# Patient Record
Sex: FEMALE | Race: WHITE | HISPANIC OR LATINO | URBAN - METROPOLITAN AREA
[De-identification: names, ages, dates, MRNs, and addresses within clinical notes are randomized per-mention and may not be internally consistent; named-entity substitution may affect disease eponyms.]

---

## 2023-05-30 ENCOUNTER — EMERGENCY (EMERGENCY)
Facility: HOSPITAL | Age: 78
LOS: 1 days | Discharge: HOME | End: 2023-05-30
Attending: EMERGENCY MEDICINE
Payer: SELF-PAY

## 2023-05-30 VITALS
DIASTOLIC BLOOD PRESSURE: 76 MMHG | HEART RATE: 66 BPM | TEMPERATURE: 99 F | OXYGEN SATURATION: 95 % | WEIGHT: 149.91 LBS | SYSTOLIC BLOOD PRESSURE: 128 MMHG | RESPIRATION RATE: 20 BRPM | HEIGHT: 60 IN

## 2023-05-30 LAB
ALBUMIN SERPL ELPH-MCNC: 4.3 G/DL — SIGNIFICANT CHANGE UP (ref 3.3–5)
ALP SERPL-CCNC: 89 U/L — SIGNIFICANT CHANGE UP (ref 40–120)
ALT FLD-CCNC: 29 U/L — SIGNIFICANT CHANGE UP (ref 10–45)
ANION GAP SERPL CALC-SCNC: 17 MMOL/L — SIGNIFICANT CHANGE UP (ref 5–17)
APPEARANCE UR: CLEAR — SIGNIFICANT CHANGE UP
AST SERPL-CCNC: 36 U/L — SIGNIFICANT CHANGE UP (ref 10–40)
BACTERIA # UR AUTO: NEGATIVE — SIGNIFICANT CHANGE UP
BASOPHILS # BLD AUTO: 0.05 K/UL — SIGNIFICANT CHANGE UP (ref 0–0.2)
BASOPHILS NFR BLD AUTO: 0.6 % — SIGNIFICANT CHANGE UP (ref 0–2)
BILIRUB SERPL-MCNC: 0.3 MG/DL — SIGNIFICANT CHANGE UP (ref 0.2–1.2)
BILIRUB UR-MCNC: NEGATIVE — SIGNIFICANT CHANGE UP
BUN SERPL-MCNC: 18 MG/DL — SIGNIFICANT CHANGE UP (ref 7–23)
CALCIUM SERPL-MCNC: 9.4 MG/DL — SIGNIFICANT CHANGE UP (ref 8.4–10.5)
CHLORIDE SERPL-SCNC: 97 MMOL/L — SIGNIFICANT CHANGE UP (ref 96–108)
CO2 SERPL-SCNC: 22 MMOL/L — SIGNIFICANT CHANGE UP (ref 22–31)
COLOR SPEC: SIGNIFICANT CHANGE UP
CREAT SERPL-MCNC: 0.62 MG/DL — SIGNIFICANT CHANGE UP (ref 0.5–1.3)
DIFF PNL FLD: ABNORMAL
EGFR: 91 ML/MIN/1.73M2 — SIGNIFICANT CHANGE UP
EOSINOPHIL # BLD AUTO: 0.7 K/UL — HIGH (ref 0–0.5)
EOSINOPHIL NFR BLD AUTO: 8.8 % — HIGH (ref 0–6)
EPI CELLS # UR: 1 /HPF — SIGNIFICANT CHANGE UP
GLUCOSE SERPL-MCNC: 103 MG/DL — HIGH (ref 70–99)
GLUCOSE UR QL: NEGATIVE — SIGNIFICANT CHANGE UP
HCT VFR BLD CALC: 47 % — HIGH (ref 34.5–45)
HGB BLD-MCNC: 15.5 G/DL — SIGNIFICANT CHANGE UP (ref 11.5–15.5)
HYALINE CASTS # UR AUTO: 0 /LPF — SIGNIFICANT CHANGE UP (ref 0–2)
IMM GRANULOCYTES NFR BLD AUTO: 0.8 % — SIGNIFICANT CHANGE UP (ref 0–0.9)
KETONES UR-MCNC: NEGATIVE — SIGNIFICANT CHANGE UP
LEUKOCYTE ESTERASE UR-ACNC: ABNORMAL
LYMPHOCYTES # BLD AUTO: 2.57 K/UL — SIGNIFICANT CHANGE UP (ref 1–3.3)
LYMPHOCYTES # BLD AUTO: 32.4 % — SIGNIFICANT CHANGE UP (ref 13–44)
MAGNESIUM SERPL-MCNC: 2 MG/DL — SIGNIFICANT CHANGE UP (ref 1.6–2.6)
MCHC RBC-ENTMCNC: 30.2 PG — SIGNIFICANT CHANGE UP (ref 27–34)
MCHC RBC-ENTMCNC: 33 GM/DL — SIGNIFICANT CHANGE UP (ref 32–36)
MCV RBC AUTO: 91.6 FL — SIGNIFICANT CHANGE UP (ref 80–100)
MONOCYTES # BLD AUTO: 0.68 K/UL — SIGNIFICANT CHANGE UP (ref 0–0.9)
MONOCYTES NFR BLD AUTO: 8.6 % — SIGNIFICANT CHANGE UP (ref 2–14)
NEUTROPHILS # BLD AUTO: 3.86 K/UL — SIGNIFICANT CHANGE UP (ref 1.8–7.4)
NEUTROPHILS NFR BLD AUTO: 48.8 % — SIGNIFICANT CHANGE UP (ref 43–77)
NITRITE UR-MCNC: NEGATIVE — SIGNIFICANT CHANGE UP
NRBC # BLD: 0 /100 WBCS — SIGNIFICANT CHANGE UP (ref 0–0)
PH UR: 6 — SIGNIFICANT CHANGE UP (ref 5–8)
PLATELET # BLD AUTO: 304 K/UL — SIGNIFICANT CHANGE UP (ref 150–400)
POTASSIUM SERPL-MCNC: 3.9 MMOL/L — SIGNIFICANT CHANGE UP (ref 3.5–5.3)
POTASSIUM SERPL-SCNC: 3.9 MMOL/L — SIGNIFICANT CHANGE UP (ref 3.5–5.3)
PROT SERPL-MCNC: 7.8 G/DL — SIGNIFICANT CHANGE UP (ref 6–8.3)
PROT UR-MCNC: SIGNIFICANT CHANGE UP
RBC # BLD: 5.13 M/UL — SIGNIFICANT CHANGE UP (ref 3.8–5.2)
RBC # FLD: 13.4 % — SIGNIFICANT CHANGE UP (ref 10.3–14.5)
RBC CASTS # UR COMP ASSIST: 5 /HPF — HIGH (ref 0–4)
SODIUM SERPL-SCNC: 136 MMOL/L — SIGNIFICANT CHANGE UP (ref 135–145)
SP GR SPEC: 1.01 — LOW (ref 1.01–1.02)
UROBILINOGEN FLD QL: NEGATIVE — SIGNIFICANT CHANGE UP
WBC # BLD: 7.92 K/UL — SIGNIFICANT CHANGE UP (ref 3.8–10.5)
WBC # FLD AUTO: 7.92 K/UL — SIGNIFICANT CHANGE UP (ref 3.8–10.5)
WBC UR QL: 10 /HPF — HIGH (ref 0–5)

## 2023-05-30 PROCEDURE — 76856 US EXAM PELVIC COMPLETE: CPT | Mod: 26

## 2023-05-30 PROCEDURE — 99285 EMERGENCY DEPT VISIT HI MDM: CPT

## 2023-05-30 PROCEDURE — 99283 EMERGENCY DEPT VISIT LOW MDM: CPT

## 2023-05-30 PROCEDURE — 74177 CT ABD & PELVIS W/CONTRAST: CPT | Mod: 26,MA

## 2023-05-30 RX ORDER — ACETAMINOPHEN 500 MG
650 TABLET ORAL ONCE
Refills: 0 | Status: COMPLETED | OUTPATIENT
Start: 2023-05-30 | End: 2023-05-30

## 2023-05-30 RX ORDER — CEFTRIAXONE 500 MG/1
1000 INJECTION, POWDER, FOR SOLUTION INTRAMUSCULAR; INTRAVENOUS ONCE
Refills: 0 | Status: COMPLETED | OUTPATIENT
Start: 2023-05-30 | End: 2023-05-30

## 2023-05-30 RX ADMIN — Medication 650 MILLIGRAM(S): at 17:02

## 2023-05-30 RX ADMIN — CEFTRIAXONE 100 MILLIGRAM(S): 500 INJECTION, POWDER, FOR SOLUTION INTRAMUSCULAR; INTRAVENOUS at 20:33

## 2023-05-30 NOTE — ED PROVIDER NOTE - CLINICAL SUMMARY MEDICAL DECISION MAKING FREE TEXT BOX
78-year-old female with PMH HTN presenting with 1 month of dysuria and vaginal pain.  Patient told she had a hematoma near her vagina/urethra [place] additional work-up for this.  Has trialed antibiotics outpatient with minimal improvement in symptoms.  Overall well-appearing with vaginal mass noted on pelvic exam.  Will obtain basic labs, UA/UC, CT abdomen pelvis to further investigate.

## 2023-05-30 NOTE — ED PROVIDER NOTE - NSFOLLOWUPCLINICS_GEN_ALL_ED_FT
[facility]  Urology  [address]  [place] [address]  Phone: [phone]  Fax:     [facility]  OB/GYN & Surg  [address] [place] [address]  Phone: [phone]  Fax:     [facility]  OB-GYN  [address] [place] [address]  Phone: [phone]  Fax:

## 2023-05-30 NOTE — ED ADULT NURSE NOTE - OBJECTIVE STATEMENT
1615 pt 78yf aox4 bib daughter c/o burning and pain on urination, arrived fr [facility] 1wk ago, noted w/ vaginal wall hematoma c/o pain to site x 1month, pt w/ daughter at bedside able to verbalize concerns, pending eval

## 2023-05-30 NOTE — ED PROVIDER NOTE - NSFOLLOWUPINSTRUCTIONS_ED_ALL_ED_FT
Please follow-up with gynecology and urology, the numbers are attached below, please call this number to set up an appointment.  Please return to the emergency department for any worsening symptoms such as those listed below.  An antibiotic was sent to your pharmacy, please take this as prescribed for the urine infection.    Urinary Tract Infection    A urinary tract infection (UTI) is an infection of any part of the urinary tract, which includes the kidneys, ureters, bladder, and urethra. Risk factors include ignoring your need to urinate, wiping back to front if female, being an uncircumcised male, and having diabetes or a weak immune system. Symptoms include frequent urination, pain or burning with urination, foul smelling urine, cloudy urine, pain in the lower abdomen, blood in the urine, and fever. If you were prescribed an antibiotic medicine, take it as told by your health care provider. Do not stop taking the antibiotic even if you start to feel better.    SEEK IMMEDIATE MEDICAL CARE IF YOU HAVE ANY OF THE FOLLOWING SYMPTOMS: unable to urinate, severe back or abdominal pain, fever, inability to keep fluids or medicine down, dizziness/lightheadedness, or a change in mental status.

## 2023-05-30 NOTE — CONSULT NOTE ADULT - SUBJECTIVE AND OBJECTIVE BOX
78Y F PMH HTN presenting with dysuria x1 month. Per her [place] patient [place] [facility] about 1 week ago. Currently on day 4/5 of ciprofloxacin for presumed UTI. Daughter reports that patient has had significant urinary burning and discomfort x1 month; was told by a physician in [facility] that she has a hematoma near her vagina. SHe is able to urinate but has pain.  SHe does have to strain to urinate and is having frequency as well. Has had two episodes of gross hematuria recently.       PAST MEDICAL & SURGICAL HISTORY:  Hypertension      No significant past surgical history        MEDICATIONS  (STANDING):    MEDICATIONS  (PRN):    FAMILY HISTORY:    Allergies    No Known Allergies    Intolerances      SOCIAL HISTORY:   Tobacco hx:    REVIEW OF SYSTEMS: Pertinent positives and negatives as stated in HPI, otherwise negative    Vital signs  T(C): 37 (23 @ 13:42), Max: 37 (23 @ 13:42)  HR: 66 (23 @ 13:42)  BP: 128/76 (23 @ 13:42)  SpO2: 95% (23 @ 13:42)  Wt(kg): --    Output    UOP    Physical Exam  Gen: NAD  Pulm: No respiratory distress, no subcostal retractions  CV: RRR, no JVD  Abd: Soft, NT, ND  : + purple, hard mass extending from vaginal introitus, very tender to palpation, unable to identify urethra   MSK: No edema present    LABS:           @ 16:33    WBC 7.92  / Hct 47.0  / SCr 0.62         136  |  97  |  18  ----------------------------<  103<H>  3.9   |  22  |  0.62    Ca    9.4      30 May 2023 16:33  Mg     2.0         TPro  7.8  /  Alb  4.3  /  TBili  0.3  /  DBili  x   /  AST  36  /  ALT  29  /  AlkPhos  89        Urinalysis Basic - ( 30 May 2023 16:33 )    Color: Light Yellow / Appearance: Clear / S.009 / pH: x  Gluc: x / Ketone: Negative  / Bili: Negative / Urobili: Negative   Blood: x / Protein: Trace / Nitrite: Negative   Leuk Esterase: Large / RBC: 5 /hpf / WBC 10 /HPF   Sq Epi: x / Non Sq Epi: x / Bacteria: Negative        Urine Cx:   Blood Cx:    RADIOLOGY:    pending      78Y F PMH HTN presenting with dysuria x1 month. Per her daughter patient [place] [facility] about 1 week ago. Currently on day 4/5 of ciprofloxacin for presumed UTI. Daughter reports that patient has had significant urinary burning and discomfort x1 month; was told by a physician in [facility] that she has a hematoma near her vagina. SHe is able to urinate but has pain.  SHe does have to strain to urinate and is having frequency as well. Has had two episodes of gross hematuria recently.       PAST MEDICAL & SURGICAL HISTORY:  Hypertension      No significant past surgical history        MEDICATIONS  (STANDING):    MEDICATIONS  (PRN):    FAMILY HISTORY:    Allergies    No Known Allergies    Intolerances      SOCIAL HISTORY:   Tobacco hx:    REVIEW OF SYSTEMS: Pertinent positives and negatives as stated in HPI, otherwise negative    Vital signs  T(C): 37 (23 @ 13:42), Max: 37 (23 @ 13:42)  HR: 66 (23 @ 13:42)  BP: 128/76 (23 @ 13:42)  SpO2: 95% (23 @ 13:42)  Wt(kg): --    Output    UOP    Physical Exam  Gen: NAD  Pulm: No respiratory distress, no subcostal retractions  CV: RRR, no JVD  Abd: Soft, NT, ND  : + purple, multiple hard masses in the vulva and near the urethra, very tender to palpation, unable to identify urethra   MSK: No edema present    LABS:           @ 16:33    WBC 7.92  / Hct 47.0  / SCr 0.62         136  |  97  |  18  ----------------------------<  103<H>  3.9   |  22  |  0.62    Ca    9.4      30 May 2023 16:33  Mg     2.0         TPro  7.8  /  Alb  4.3  /  TBili  0.3  /  DBili  x   /  AST  36  /  ALT  29  /  AlkPhos  89        Urinalysis Basic - ( 30 May 2023 16:33 )    Color: Light Yellow / Appearance: Clear / S.009 / pH: x  Gluc: x / Ketone: Negative  / Bili: Negative / Urobili: Negative   Blood: x / Protein: Trace / Nitrite: Negative   Leuk Esterase: Large / RBC: 5 /hpf / WBC 10 /HPF   Sq Epi: x / Non Sq Epi: x / Bacteria: Negative        Urine Cx:   Blood Cx:    RADIOLOGY:    pending

## 2023-05-30 NOTE — ED PROVIDER NOTE - PROGRESS NOTE DETAILS
Katelyn Junior DO (PGY2): Patient signed out to me by day team.  Patient with vaginal mass concerning for possible cervical versus vaginal versus urethral cancer.  CT showing lymph nodes concern for neoplastic disease.  Patient with mild UTI.  Patient seen by urology and gynecology.  Patient to follow-up with urology and gynecology outpatient for further malignancy work-up.  Will discharge with p.o. antibiotics, patient status post IV ceftriaxone here. Pt and family made aware of lab and imaging results, including incidental findings. Questions regarding their symptoms were addressed. Advised to follow up with gyn/urology. Given strict return precautions. Pt verbalized understanding.

## 2023-05-30 NOTE — ED PROVIDER NOTE - PATIENT PORTAL LINK FT
You can access the FollowMyHealth Patient Portal offered by [facility] by registering at the following website: [email or web address]. By joining [facility]’s FollowMyHealth portal, you will also be able to view your health information using other applications (apps) compatible with our system.

## 2023-05-30 NOTE — ED PROVIDER NOTE - ATTENDING CONTRIBUTION TO CARE
Attending MD Salguero:  I personally have seen and examined this patient. I have performed a substantive portion of the visit including all aspects of the medical decision making.  Resident note reviewed and agree on plan of care and except where noted.        78-year-old woman with a history of hypertension's presenting for evaluation of dysuria and vaginal discomfort for about a month.  She has also some difficulty urinating and also urinary incontinence at times otherwise.  She is on ciprofloxacin day 4 of 5 for presumed UTI.  No flank or back pain no fevers or chills.  Patient was told recently that she had a vaginal mass and that she was supposed to come in for further work-up and this but has not done so yet.    Vital signs nonactionable.  The patient's sitting in the stretcher no apparent distress.  High BMI.  Abdomen is soft nondistended nontender.   examination with partially visualized vaginal mass in about the introitus, it is purple in color.  Extremities warm and well-perfused affect within normal limits.    Patient presenting for urinary symptoms and exam notable for vaginal mass.  We will obtain CT abdomen pelvis to further initiate work-up of vaginal mass.  We will see on the CT if there is any urinary retention that may require Carr placement due to presence of vaginal mass.  Depending on CT findings next diagnostic steps will be determined thereafter.      *The above represents an initial assessment/impression. Please refer to progress notes for potential changes in patient clinical course*

## 2023-05-30 NOTE — CONSULT NOTE ADULT - ASSESSMENT
- No acute GYN intervention   - Concern for possible cervical etiology vs bladder etiology   - Patient will need GYN outpatient follow up for biopsy. She is currently being worked up by her GYN in [facility], but her daughter would like to start workup here. Phone number to OBGYN clinic given to patient's daughter. Additionally, GYN team will reach out to the clinic schedulers to facilitate scheduling.      Isabelle Ellis, PGY2  d/w Dr. Matos  79 yo  LMP 30+ years ago presenting with pain with urination x1 week. Vaginal mass present on exam. Patient unable to tolerate speculum exam. Purple mass visible at the introitus. Digital exam revealed smooth mass possibly contiguous with the cervix. Given the imaging, additional concern for bladder involvement of the mass.     - No acute GYN intervention   - Concern for possible cervical etiology vs bladder etiology   - Patient will need GYN outpatient follow up for biopsy. She is currently being worked up by her GYN in [facility], but her daughter would like to start workup here. Phone number to OBGYN clinic given to patient's daughter. Additionally, GYN team will reach out to the clinic schedulers to facilitate scheduling.      Isabelle Ellis, PGY2  d/w Dr. Matos

## 2023-05-30 NOTE — CONSULT NOTE ADULT - SUBJECTIVE AND OBJECTIVE BOX
KARTHIKEYAN ALCOCER  78y  Female [id number]    HPI:        Name of GYN Physician:     Past OB:      Past gyn: Denies fibroids, cysts, endometriosis, STI's, Abnormal pap smears     Home meds:     Hospital Meds:   MEDICATIONS  (STANDING):    MEDICATIONS  (PRN):      Allergies    No Known Allergies    Intolerances        PAST MEDICAL & SURGICAL HISTORY:  Hypertension      No significant past surgical history          FAMILY HISTORY:      Social History:  Denies smoking use, drug use, alcohol use.   +occasional social alcohol use    Vital Signs Last 24 Hrs  T(C): 37 (30 May 2023 13:42), Max: 37 (30 May 2023 13:42)  T(F): 98.6 (30 May 2023 13:42), Max: 98.6 (30 May 2023 13:42)  HR: 65 (30 May 2023 19:40) (65 - 66)  BP: 145/74 (30 May 2023 19:40) (128/76 - 145/74)  BP(mean): --  RR: 18 (30 May 2023 19:40) (18 - 20)  SpO2: 94% (30 May 2023 19:40) (94% - 95%)    Parameters below as of 30 May 2023 19:40  Patient On (Oxygen Delivery Method): room air        Physical Exam:   General: sitting comfortably in bed, NAD   CV: RR S1S2 no m/r/g  Lungs: CTA b/l, good air flow b/l   Back: No CVA tenderness  Abd: Soft, non-tender, non-distended.  Bowel sounds present.    :  No bleeding on pad.    External labia wnl.  Bimanual exam with no cervical motion tenderness, uterus wnl, adnexa non palpable b/l.  Cervix closed vs. Cervix dilated    cm   Speculum Exam: No active bleeding from os.  Physiologic discharge.    Ext: non-tender b/l, no edema     LABS:                              15.5   7.92  )-----------( 304      ( 30 May 2023 16:33 )             47.0     05-    136  |  97  |  18  ----------------------------<  103<H>  3.9   |  22  |  0.62    Ca    9.4      30 May 2023 16:33  Mg     2.0         TPro  7.8  /  Alb  4.3  /  TBili  0.3  /  DBili  x   /  AST  36  /  ALT  29  /  AlkPhos  89  05-30    I&O's Detail      Urinalysis Basic - ( 30 May 2023 16:33 )    Color: Light Yellow / Appearance: Clear / S.009 / pH: x  Gluc: x / Ketone: Negative  / Bili: Negative / Urobili: Negative   Blood: x / Protein: Trace / Nitrite: Negative   Leuk Esterase: Large / RBC: 5 /hpf / WBC 10 /HPF   Sq Epi: x / Non Sq Epi: x / Bacteria: Negative        RADIOLOGY & ADDITIONAL STUDIES: KARTHIKEYAN ALCOCER  78y  Female [id number]    HPI:  79 yo  LMP 30+ years ago presenting with pain with urination x1 week. Pt lives in [facility] and is visiting family. Daughter states that her mother was being evaluated for urinary symptoms in [facility] and was given Cipro to take for a presumed UTI. Her gynecologist there saw a mass in the vagina and recommended further imaging and workup that they would continue once she returned from her trip to the [place]. Patient endorsing dysuria, frequency, and urgency. Patient denies vaginal bleeding, abdominal pain.     Name of GYN Physician: A doctor in [facility]     Past OB:     x7     Past gyn: Denies fibroids, cysts, endometriosis, STI's, Abnormal pap smears. Has not had a pap smear in many years.     Home meds: Amlodipine 10 mg qd     Allergies: No Known Allergies    PAST MEDICAL & SURGICAL HISTORY:  - Hypertension    Social History:  Denies smoking use, drug use, alcohol use.       Vital Signs Last 24 Hrs  T(C): 37 (30 May 2023 13:42), Max: 37 (30 May 2023 13:42)  T(F): 98.6 (30 May 2023 13:42), Max: 98.6 (30 May 2023 13:42)  HR: 65 (30 May 2023 19:40) (65 - 66)  BP: 145/74 (30 May 2023 19:40) (128/76 - 145/74)  BP(mean): --  RR: 18 (30 May 2023 19:40) (18 - 20)  SpO2: 94% (30 May 2023 19:40) (94% - 95%)    Parameters below as of 30 May 2023 19:40  Patient On (Oxygen Delivery Method): room air        Physical Exam:   General: sitting comfortably in bed, NAD   Abd: Soft, non-tender, non-distended.    :  No bleeding on pad. Palpable purple appearing mass visualized at the introitus of the vagina.  Patient able to tolerate a single digital exam. Mass feels smooth and contiguous with cervix. No anterior or posterior masses palpated.   Speculum Exam: Unable to tolerate speculum exam    Ext: non-tender b/l, no edema     LABS:                              15.5   7.92  )-----------( 304      ( 30 May 2023 16:33 )             47.0     05-30    136  |  97  |  18  ----------------------------<  103<H>  3.9   |  22  |  0.62    Ca    9.4      30 May 2023 16:33  Mg     2.0         TPro  7.8  /  Alb  4.3  /  TBili  0.3  /  DBili  x   /  AST  36  /  ALT  29  /  AlkPhos  89      I&O's Detail      Urinalysis Basic - ( 30 May 2023 16:33 )    Color: Light Yellow / Appearance: Clear / S.009 / pH: x  Gluc: x / Ketone: Negative  / Bili: Negative / Urobili: Negative   Blood: x / Protein: Trace / Nitrite: Negative   Leuk Esterase: Large / RBC: 5 /hpf / WBC 10 /HPF   Sq Epi: x / Non Sq Epi: x / Bacteria: Negative        RADIOLOGY & ADDITIONAL STUDIES:    < from: CT Abdomen and Pelvis w/ IV Cont (23 @ 18:50) >  CT ABDOMEN AND PELVIS IC   ORDERED BY:  LOCO MI     PROCEDURE DATE:  2023          INTERPRETATION:  CLINICAL INFORMATION: Hematuria, evaluate for stone   versus cystitis.    COMPARISON: None.    CONTRAST/COMPLICATIONS:  IV Contrast: Omnipaque 350  90 cc administered   10 cc discarded  Oral Contrast: NONE  Complications: None reported at time of study completion    PROCEDURE:  CT of the Abdomen and Pelvis was performed.  Sagittal and coronal reformats were performed.    FINDINGS:  LOWER CHEST: Within normal limits.    LIVER: Fatty infiltration. 1.0 cm hyperattenuating focus lateral segment.  BILE DUCTS: Normal caliber.  GALLBLADDER: Within normal limits.  SPLEEN: Within normal limits.  PANCREAS: Within normal limits.  ADRENALS: A 2.6 cm left adrenal nodule.  KIDNEYS/URETERS: Within normal limits.    BLADDER:  Minimal perivesicle edema.  REPRODUCTIVE ORGANS: Uterus and adnexa within normal limits.    BOWEL: No bowel obstruction. Appendix is normal.  PERITONEUM: No ascites.  VESSELS: Atherosclerotic changes.  RETROPERITONEUM/LYMPH NODES: No lymphadenopathy. Lobulated 2.0 cm   enlarged left inguinal lymph node. Enlarged 1.5 cm right inguinal lymph   node.  ABDOMINAL WALL: Moderate fat-containing right inguinal hernia.  BONES: Degenerative changes.    IMPRESSION:  Minimal perivesical edema is nonspecific but may be seen in the setting   of cystitis. Correlation with urinalysis recommended.    Enlarged abnormal appearing bilateral inguinal inguinal lymphnodes for   which neoplastic disease is in the differential diagnosis. Further   evaluation recommended as an outpatient with consideration of   ultrasound-guided biopsy.    Indeterminate 2.6 cm left adrenal nodule. Recommend MR abdomen as an   outpatient.        --- End of Report ---      < end of copied text >

## 2023-05-30 NOTE — ED PROVIDER NOTE - OBJECTIVE STATEMENT
78Y F PMH HTN presenting with urinary pain x1 month. Patient's daughter is at bedside providing history and interpretation - [place] [facility] about 1 week ago. Currently on day 4/5 of ciprofloxacin for presumed UTI. Daughter reports that patient has had significant urinary burning and discomfort x1 month; was told by a physician in [facility] that she has a hematoma near her vagina. Within the last week has been 78Y F PMH HTN presenting with urinary pain x1 month. Patient's daughter is at bedside providing history and interpretation - states that [place] from [facility] about 1 week ago. Currently on day 4/5 of ciprofloxacin for presumed UTI. Daughter reports that patient has had significant urinary burning and discomfort x1 month; was told by a physician in [facility] that she has a hematoma near her vagina. Within the last week has been having worsening dysuria and now seeing blood in urine. Patient endorsing vaginal discomfort, denies abdominal pain, fevers, n/v/d, leg pain or swelling.

## 2023-05-30 NOTE — ED PROVIDER NOTE - PHYSICAL EXAMINATION
GENERAL: Awake, alert, NAD  HEAD: NC/AT, neck supple, moist mucous membranes  EYES: PERRL, EOM grossly intact, sclera anicteric  LUNGS: normal WOB on RA, CTAB, no wheezes or crackles   CARDIAC: RRR, no m/r/g  ABDOMEN: obese abdomen, soft, nontender to palpation  PELVIC EXAM: performed by me chaperoned by JHON Wren - small red/purple mass noted within vaginal opening, very tender to palpation   BACK: No midline spinal tenderness, no CVA tenderness  EXT: No edema, no calf tenderness, no deformities.  NEURO: A&Ox3. Moving all extremities.  SKIN: Warm and dry. No rash.  PSYCH: Normal affect.

## 2023-05-30 NOTE — CONSULT NOTE ADULT - ASSESSMENT
78 year old female with vaginal mass, as well as dysuria and urinary frequency  - f/u CT scan, mass highly concerning for malignancy  - pain control  - check bladder scan   - consider gyn consult   - Urology can place culver catheter if need be   78 year old female with vaginal mass, as well as dysuria, pelvic discomfort, and urinary frequency  - f/u CT scan, mass highly concerning for malignancy  - pain control  - check bladder scan   - consider gyn consult since mass extending from vaginal introitus   - Urology can place culver catheter if need be   78 year old female with vaginal mass, as well as dysuria, pelvic discomfort, and urinary frequency  - f/u CT scan, mass highly concerning for malignancy  - pain control  - check bladder scan   - consider gyn consult since mass extending from vaginal introitus      78 year old female with vaginal mass, as well as dysuria, pelvic discomfort, and urinary frequency  - f/u CT scan, mass highly concerning for malignancy  - pain control  - check bladder scan   - Rec gyn consult since mass extending from vaginal introitus   - Urology will follow  - Discussed with Dr. Villavicencio

## 2023-05-30 NOTE — CONSULT NOTE ADULT - NS PANP COMMENT GEN_ALL_CORE FT
78 year old female with dysuria found to have vulvar/periurethral tender masses. CT shows bilateral inguinal lymphadenopathy. exam limited due to pain. discussed with patient and daughter need for outpatient workup and possible exam under anesthesia with biopsy. recommend GYN consultation as well

## 2023-05-31 VITALS
OXYGEN SATURATION: 94 % | RESPIRATION RATE: 18 BRPM | TEMPERATURE: 99 F | HEART RATE: 66 BPM | DIASTOLIC BLOOD PRESSURE: 73 MMHG | SYSTOLIC BLOOD PRESSURE: 145 MMHG

## 2023-05-31 LAB
CULTURE RESULTS: SIGNIFICANT CHANGE UP
SPECIMEN SOURCE: SIGNIFICANT CHANGE UP

## 2023-05-31 PROCEDURE — 74177 CT ABD & PELVIS W/CONTRAST: CPT | Mod: MA

## 2023-05-31 PROCEDURE — 76856 US EXAM PELVIC COMPLETE: CPT

## 2023-05-31 PROCEDURE — 87086 URINE CULTURE/COLONY COUNT: CPT

## 2023-05-31 PROCEDURE — 83735 ASSAY OF MAGNESIUM: CPT

## 2023-05-31 PROCEDURE — 81001 URINALYSIS AUTO W/SCOPE: CPT

## 2023-05-31 PROCEDURE — 80053 COMPREHEN METABOLIC PANEL: CPT

## 2023-05-31 PROCEDURE — 99284 EMERGENCY DEPT VISIT MOD MDM: CPT | Mod: 25

## 2023-05-31 PROCEDURE — 96374 THER/PROPH/DIAG INJ IV PUSH: CPT | Mod: XU

## 2023-05-31 PROCEDURE — 96375 TX/PRO/DX INJ NEW DRUG ADDON: CPT

## 2023-05-31 PROCEDURE — 85025 COMPLETE CBC W/AUTO DIFF WBC: CPT

## 2023-05-31 RX ORDER — CEPHALEXIN 500 MG
1 CAPSULE ORAL
Qty: 28 | Refills: 0
Start: 2023-05-31 | End: 2023-06-06

## 2023-05-31 RX ORDER — KETOROLAC TROMETHAMINE 30 MG/ML
15 SYRINGE (ML) INJECTION ONCE
Refills: 0 | Status: DISCONTINUED | OUTPATIENT
Start: 2023-05-31 | End: 2023-05-31

## 2023-05-31 RX ADMIN — Medication 15 MILLIGRAM(S): at 00:20

## 2023-06-01 PROBLEM — Z00.00 ENCOUNTER FOR PREVENTIVE HEALTH EXAMINATION: Status: ACTIVE | Noted: 2023-06-01

## 2023-06-01 PROBLEM — I10 ESSENTIAL (PRIMARY) HYPERTENSION: Chronic | Status: ACTIVE | Noted: 2023-05-30

## 2023-06-02 ENCOUNTER — OUTPATIENT (OUTPATIENT)
Dept: OUTPATIENT SERVICES | Facility: HOSPITAL | Age: 78
LOS: 1 days | End: 2023-06-02

## 2023-06-02 ENCOUNTER — APPOINTMENT (OUTPATIENT)
Dept: UROLOGY | Facility: CLINIC | Age: 78
End: 2023-06-02

## 2023-06-02 DIAGNOSIS — R35.0 FREQUENCY OF MICTURITION: ICD-10-CM

## 2023-06-07 ENCOUNTER — LABORATORY RESULT (OUTPATIENT)
Age: 78
End: 2023-06-07

## 2023-06-07 NOTE — PHYSICAL EXAM
[General Appearance - Well Developed] : well developed [General Appearance - Well Nourished] : well nourished [Normal Appearance] : normal appearance [Heart Rate And Rhythm] : Heart rate and rhythm were normal [] : no respiratory distress [Abdomen Soft] : soft [Abdomen Tenderness] : non-tender [FreeTextEntry1] : Patient with necrotic, loculated vaginal mass which can be seen near the vaginal intritous. Vaginal mass is tender to the touch. [Normal Station and Gait] : the gait and station were normal for the patient's age [Oriented To Time, Place, And Person] : oriented to person, place, and time [Affect] : the affect was normal

## 2023-06-07 NOTE — ASSESSMENT
[FreeTextEntry1] : 78y F with concern for UTI at recent ED visit found to have a vaginal mass with associated inguinal lymphadenopathy. Patients urine culture was negative from the ED and given the finding of the vaginal mass she would likely benefit from evaluation with gyn-onc to have the mass evaluated. Given how tender the mass is, it could also represent a infectious process but malignancy cannot be excluded in the setting lymphadenopathy.\par \par Plan\par - gyn-onc contacted to help coordinate appointment and evaluation

## 2023-06-07 NOTE — HISTORY OF PRESENT ILLNESS
[FreeTextEntry1] : HPI: Patient presenting to the urology office after recently presenting to the emergency department with urinary symptoms (dysuria, frequency, urgency) with concern for UTI. Patient was started on abx and discharged from the ED. Patient has had persistent urinary symptoms since that time although urine culture from the ED were negative. In the ED patient states that providers did a  exam which showed a possible vaginal mass. Patient had a CT which demonstrated bilateral inguinal lymphadenopathy, no hydronephrosis, or stranding around the bladder. \par \par On exam patient found to have loculated vaginal mass with concern for malignancy.

## 2023-06-08 ENCOUNTER — APPOINTMENT (OUTPATIENT)
Dept: OBGYN | Facility: CLINIC | Age: 78
End: 2023-06-08
Payer: COMMERCIAL

## 2023-06-08 ENCOUNTER — OUTPATIENT (OUTPATIENT)
Dept: OUTPATIENT SERVICES | Facility: HOSPITAL | Age: 78
LOS: 1 days | End: 2023-06-08
Payer: SELF-PAY

## 2023-06-08 VITALS — SYSTOLIC BLOOD PRESSURE: 160 MMHG | WEIGHT: 188 LBS | DIASTOLIC BLOOD PRESSURE: 84 MMHG

## 2023-06-08 DIAGNOSIS — N76.0 ACUTE VAGINITIS: ICD-10-CM

## 2023-06-08 PROCEDURE — 36415 COLL VENOUS BLD VENIPUNCTURE: CPT

## 2023-06-08 PROCEDURE — 87086 URINE CULTURE/COLONY COUNT: CPT

## 2023-06-08 PROCEDURE — 57100 BIOPSY VAGINAL MUCOSA SIMPLE: CPT

## 2023-06-08 PROCEDURE — 99203 OFFICE O/P NEW LOW 30 MIN: CPT | Mod: 25

## 2023-06-08 PROCEDURE — T1013: CPT

## 2023-06-08 PROCEDURE — G0463: CPT

## 2023-06-08 PROCEDURE — 57100 BIOPSY VAGINAL MUCOSA SIMPLE: CPT | Mod: NC

## 2023-06-08 RX ORDER — AMLODIPINE BESYLATE 5 MG/1
TABLET ORAL
Refills: 0 | Status: ACTIVE | COMMUNITY

## 2023-06-08 RX ORDER — HYDROCHLOROTHIAZIDE 12.5 MG/1
12.5 TABLET ORAL
Refills: 0 | Status: ACTIVE | COMMUNITY

## 2023-06-08 NOTE — DISCUSSION/SUMMARY
[FreeTextEntry1] : 77 yo postmenopausal F,  - tender anterior large vaginal mass of unknown etiology, suspicious for malignancy\par \par #Vaginal / anterior wall mass with extension to lt labia minora: \par - pt able to void, s/p urology consult \par - [ ] Punch biopsy done, pending results\par - discussed with pt and daughter via  , suspicious for malignancy, will schedule gyn onc consult while pathology pending\par - pt to go to ED if unable to void or if pain worsens\par - pain mostly to touch and with urination-  [ ]lidocane gel rx sent\par - reviewed w/ Dr. Katz who also examined pt and agreed with plan. \par \par # dysuria \par - s/p urology consult\par - [ ]urie cx sent again \par - no evidence of infx at this time\par \par GYN ONC referral made\par Will f/u path\par PETE Torres, MS 3\par Milly Damon PAC

## 2023-06-08 NOTE — PLAN
[FreeTextEntry1] : Patient is a 77 yo postmenopausal F,  who presents for evaluation of urinary symptoms and a vaginal mass.\par \par #UTI\par - Urine cultures sent \par \par #Vaginal mass: \par - Punch biopsy done, pending results\par - Follow up with gyn/onc  \par \par GHASSAN Torres - MS3

## 2023-06-08 NOTE — HISTORY OF PRESENT ILLNESS
[FreeTextEntry1] : Patient is a 79 yo post menopausal F,  who presents as ER follow up/ new pt.  Pt seen in ED  c/o severe pain with urination and a vaginal mass. [place] from [facility]. For the past 2 months, she has been experiencing vaginal pain, which has been worsening. She was also having urinary symptoms last week, including urinary frequency and burning with urination. Patient was brought to the ED and had a CT abdomen/pelvis concerning for inguinal lymph nodes and a superficial vaginal mass concerning for malignancy.  Gyn consult in ED recommended f/u in office for bx.   She was prescribed Cephalexin, but continues to have pain with urination and vaginal pain. Pt followed up with Urology  who only recommended f/u with gyn onc.  Patient reports some bleeding from the mass and pain. Patient denies any changes in bowel movements and reports no fever since her ED visit.  Pt is tearful, in pain when she urinates.\par Daughters present.  Pt is limited historian. \par \par OB Hx: , 6 vaginal deliveries, 1  \par GYN Hx: none  unknown last pap, mammo.  Limited care. \par PMHx: hypertension \par Surgical history:  \par Medications: Amlodipine/Valsartan/HCTZ\par Allergies: none \par

## 2023-06-08 NOTE — PROCEDURE
[Vulvar Biopsy] : Vulvar Biopsy [Time out performed] : Pre-procedure time out performed.  Patient's name, date of birth and procedure confirmed. [Consent Obtained] : Consent obtained [Size of Biopsy Taken: ___ (mm)] : [unfilled]Umm [Local Anesthesia] : local anesthesia [____ Lidocaine w/o Epi] : ~VmL lidocaine without epinephrine [Betadine] : Betadine [Sent to Pathology] : placed in buffered formalin and sent for pathology [Punch] : punch biopsy [Neyda's] : Neyda's solution [Silver Nitrate] : silver nitrate [Pressure] : the application of direct pressure [de-identified] : lt labia minora lesion - identical to ant vag wall mass [de-identified] : Pt with severe tenderness w/ palpation to any of lesions.  [de-identified] : lidocaine gel inserted into vagina at completion.

## 2023-06-08 NOTE — PHYSICAL EXAM
[Chaperone Present] : A chaperone was present in the examining room during all aspects of the physical examination [FreeTextEntry1] : MS3/  Dr. Sterling MA, daughter [Alert] : alert [Soft] : soft [Non-tender] : non-tender [Normal] : normal external genitalia [FreeTextEntry2] : + lt labia nimora with 3 cm hard/ tender// necrotic appearing lesion (matching vaginal mass) [FreeTextEntry4] : + large 5 cm ant wall vaginal mass, dark purple/ necrotic appearing/ very tender mass- unable to discern origin.  not actively bleeding.   [FreeTextEntry5] : unable to tolerate ped spec  exam past introitus, unable to pass beneath mass to look for cvx.

## 2023-06-09 ENCOUNTER — LABORATORY RESULT (OUTPATIENT)
Age: 78
End: 2023-06-09

## 2023-06-10 LAB
CULTURE RESULTS: SIGNIFICANT CHANGE UP
SPECIMEN SOURCE: SIGNIFICANT CHANGE UP

## 2023-06-15 DIAGNOSIS — R30.0 DYSURIA: ICD-10-CM

## 2023-06-15 DIAGNOSIS — N89.8 OTHER SPECIFIED NONINFLAMMATORY DISORDERS OF VAGINA: ICD-10-CM

## 2023-06-19 ENCOUNTER — NON-APPOINTMENT (OUTPATIENT)
Age: 78
End: 2023-06-19

## 2023-06-21 ENCOUNTER — OUTPATIENT (OUTPATIENT)
Dept: OUTPATIENT SERVICES | Facility: HOSPITAL | Age: 78
LOS: 1 days | End: 2023-06-21

## 2023-06-21 ENCOUNTER — APPOINTMENT (OUTPATIENT)
Dept: GYNECOLOGIC ONCOLOGY | Facility: HOSPITAL | Age: 78
End: 2023-06-21
Payer: COMMERCIAL

## 2023-06-21 VITALS
DIASTOLIC BLOOD PRESSURE: 68 MMHG | BODY MASS INDEX: 36.91 KG/M2 | WEIGHT: 188 LBS | SYSTOLIC BLOOD PRESSURE: 130 MMHG | HEIGHT: 60 IN | TEMPERATURE: 98 F | HEART RATE: 79 BPM

## 2023-06-21 PROCEDURE — 99204 OFFICE O/P NEW MOD 45 MIN: CPT | Mod: GC

## 2023-06-21 NOTE — HISTORY OF PRESENT ILLNESS
[FreeTextEntry1] : Croatian speaking, declines \par Daughter at bedside acting as \par \par 77 yo f,  with h/o  x1 () who presents as a referral for vaginal mass (biopsy positive for melanoma). \par Patient has noticed mass in vagina for about a year, states it started causing pain for the last 2 months. \par Patient reports increasing, worsening pain with urination and with sitting. \par Denies changes in bowel habits, bloating, or N/V. \par \par Vulvar biopsy (23): Malignant melanoma with ulceration, nodular type, Breslow thickness at least 4.1mm, involving the peripheral biopsy margins. T classification: pT4b\par \par CT A/P (23): Uterus and adnexa wnl. Minimal perivesical edema noted in bladder. 2.6cm Left adrenal nodule. lobulated 2cm, enlarged left inguinal lymph node. Enlarged 1.5cm right inguinal lymph node.

## 2023-06-21 NOTE — PHYSICAL EXAM
[Chaperone Present] : A chaperone was present in the examining room during all aspects of the physical examination [Capable of only limited self care, confined to bed or chair more than 50% of waking hours] : Status 3- Capable of only limited self care, confined to bed or chair more than 50% of waking hours [Normal] : Examination for hernias: No hernia appreciated [Absent] : CVAT: absent [FreeTextEntry1] : appears in discomfort with sitting [de-identified] : no nodules, no lymphadenopathy noted [de-identified] : soft, no abdominal tenderness, no guarding, no rebound [de-identified] : 3-4cm lymph nodes, bilateral 3cm bilateral inguinal lymph nodes, mobile - not fixed, not ulcerated [de-identified] : Mass involving the distal urethra approx 2cm, vulva and distal vagina anteriorly, discoloration noted and necrotic

## 2023-06-21 NOTE — DISCUSSION/SUMMARY
[Reviewed Clinical Lab Test(s)] : Results of clinical tests were reviewed. [FreeTextEntry1] : 69 yof with newly diagnosed vaginal/vulvar melanoma with physical exam concerning for bilateral inguinal lymphadenopathy. \par \par - Referral made to Dr. Jose Ch (Med Onc)\par - Patient sent Oxy 5mg to be taken q6h PRN severe pain, #30\par - PET CT\par \par \par Seen and discussed with Dr. Crowder and Dr. Goldberg\par Watson Judge, PGY1\par \par Dr Shubham Ch aware of patient and will see her on Friday 6/23/23.

## 2023-06-22 ENCOUNTER — OUTPATIENT (OUTPATIENT)
Dept: OUTPATIENT SERVICES | Facility: HOSPITAL | Age: 78
LOS: 1 days | Discharge: HOME | End: 2023-06-22

## 2023-06-22 DIAGNOSIS — C43.31 MALIGNANT MELANOMA OF NOSE: ICD-10-CM

## 2023-06-22 DIAGNOSIS — C52 MALIGNANT NEOPLASM OF VAGINA: ICD-10-CM

## 2023-06-22 DIAGNOSIS — R59.0 LOCALIZED ENLARGED LYMPH NODES: ICD-10-CM

## 2023-06-22 DIAGNOSIS — C51.9 MALIGNANT NEOPLASM OF VULVA, UNSPECIFIED: ICD-10-CM

## 2023-06-23 ENCOUNTER — APPOINTMENT (OUTPATIENT)
Dept: HEMATOLOGY ONCOLOGY | Facility: CLINIC | Age: 78
End: 2023-06-23
Payer: COMMERCIAL

## 2023-06-23 VITALS
TEMPERATURE: 97 F | SYSTOLIC BLOOD PRESSURE: 110 MMHG | OXYGEN SATURATION: 94 % | BODY MASS INDEX: 34.8 KG/M2 | HEIGHT: 58.07 IN | DIASTOLIC BLOOD PRESSURE: 71 MMHG | RESPIRATION RATE: 15 BRPM | HEART RATE: 76 BPM | WEIGHT: 165.77 LBS

## 2023-06-23 PROCEDURE — 99215 OFFICE O/P EST HI 40 MIN: CPT

## 2023-06-23 NOTE — HISTORY OF PRESENT ILLNESS
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  She is currently living in [place] with her daughter.\par \par She initially noted vaginal discomfort approximately 4 months ago while she was traveling in [place].  [place] the [place] in May [place]23 and then presented to the ED on 05/30/2023 for pain with urination as well as a vaginal mass.  An abdominal and pelvic CT scan, performed on 05/30/2023, demonstrated a lobulated 2.0 cm left inguinal node and a 1.5 cm right inguinal node, as well as an indeterminate 2.6 cm left adrenal nodule. Transabdominal and translabial sonogram demonstrated a 1.2 x 0.6 x 0.5 cm lesion protruding from the vaginal opening.\par \par Lilliana ultimately underwent a biopsy on 06/08/2023, with pathology consistent with an at least 4.1 mm ulcerated melanoma, with positive peripheral margins. There are 4 mitoses per mm2.  There is no lymphovascular invasion or neurotropism.  There are nonbrisk TILS.   She was seen by Dr. Gary Goldberg of gynecologic oncology on 06/21/2023, and is now seen here for further discussion.\par \par PAST MEDICAL HISTORY:\par 1. Hypertension\par 2. Arthritis of the bilateral knees\par 3. status post knee surgery\par \par SOCIAL HISTORY:\par She had 7 children, 5 of whom are alive.  She is currently residing with her daughter in [place].\par \par ALLERGIES:  None

## 2023-06-23 NOTE — ASSESSMENT
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  I had a long discussion with the patient and her family, with her granddaughter serving as , regarding her diagnosis, prognosis and management options.  \par \par I discussed the need to complete the workup regarding extent of disease as well as molecular profiling.  To this end, we will proceed with the PET/CT scan as well as brain MRI.  We will likely need an MRI of the adrenal lesion which can be performed at a later date.  We will arrange for an ultrasound guided biopsy of one of the inguinal notes to confirm regional involvement.  We will also arrange for molecular profiling of the tumor.\par \par Given the morbidity associated with surgery and the concern for regional and distant disease, other considerations for local control would include definitive radiotherapy.  We will arrange for her to be seen be radiation oncology in the near future.\par \par I discussed systemic therapeutic options which include checkpoint blockade, either single agent anti-PD1 therapy, nivolumab/relatlimab, or ipilimumab/nivolumab.  I reviewed the available real world data suggesting equivalent efficacy between single agent anti-PD1 therapy and ipilimumab/nivolumab.  I discussed the limited data available for relatlimab/nivolumab in this setting.  I also discussed the data suggesting that the addition of anti-angiogentic therapy to checkpoint blockade may improve efficacy.  I also discussed the potential targeted therapeutic options.\par \par After a long discussion, all of the patient's questions and concerns were answered to her satisfaction.  We will optimize pain control and tentatively plan to begin therapy next week.

## 2023-06-23 NOTE — PHYSICAL EXAM
[Ambulatory and capable of all self care but unable to carry out any work activities] : Status 2- Ambulatory and capable of all self care but unable to carry out any work activities. Up and about more than 50% of waking hours [Obese] : obese [Normal] : affect appropriate [de-identified] : Uncomfortable due to vaginal pain [de-identified] : Palpable bilateral inguinal lymphadenopathy

## 2023-06-23 NOTE — REVIEW OF SYSTEMS
[Diarrhea: Grade 0] : Diarrhea: Grade 0 [Dysuria] : dysuria [Vaginal Discharge] : vaginal discharge [Joint Pain] : joint pain [Negative] : Allergic/Immunologic

## 2023-06-28 ENCOUNTER — APPOINTMENT (OUTPATIENT)
Dept: NUCLEAR MEDICINE | Facility: CLINIC | Age: 78
End: 2023-06-28

## 2023-06-29 ENCOUNTER — APPOINTMENT (OUTPATIENT)
Dept: INFUSION THERAPY | Facility: HOSPITAL | Age: 78
End: 2023-06-29

## 2023-06-29 ENCOUNTER — APPOINTMENT (OUTPATIENT)
Dept: HEMATOLOGY ONCOLOGY | Facility: CLINIC | Age: 78
End: 2023-06-29
Payer: COMMERCIAL

## 2023-06-29 VITALS
RESPIRATION RATE: 16 BRPM | OXYGEN SATURATION: 94 % | DIASTOLIC BLOOD PRESSURE: 77 MMHG | WEIGHT: 160.5 LBS | TEMPERATURE: 97 F | BODY MASS INDEX: 33.69 KG/M2 | HEART RATE: 76 BPM | HEIGHT: 58.07 IN | SYSTOLIC BLOOD PRESSURE: 127 MMHG

## 2023-06-29 DIAGNOSIS — I10 ESSENTIAL (PRIMARY) HYPERTENSION: ICD-10-CM

## 2023-06-29 PROCEDURE — 99215 OFFICE O/P EST HI 40 MIN: CPT

## 2023-06-29 RX ORDER — PROCHLORPERAZINE MALEATE 10 MG/1
10 TABLET ORAL
Qty: 90 | Refills: 3 | Status: ACTIVE | COMMUNITY
Start: 2023-06-29 | End: 1900-01-01

## 2023-06-29 NOTE — REVIEW OF SYSTEMS
[Fatigue] : fatigue [Diarrhea: Grade 0] : Diarrhea: Grade 0 [Joint Pain] : joint pain [Swollen Glands] : swollen glands [Negative] : Allergic/Immunologic [FreeTextEntry2] : Pain

## 2023-06-29 NOTE — ASSESSMENT
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 I again had a long discussion with the patient and her family, with her granddaughter serving as , regarding her diagnosis, prognosis and management options. \par \par I discussed the need to complete the workup regarding extent of disease as well as molecular profiling. To this end, we will proceed with the PET/CT scan as well as brain MRI. We will likely need an MRI of the adrenal lesion which can be performed at a later date. We will arrange for an ultrasound guided biopsy of one of the inguinal notes to confirm regional involvement. We will also arrange for molecular profiling of the tumor.\par \par Given the morbidity associated with surgery and the concern for regional and distant disease, other considerations for local control would include definitive radiotherapy. We will arrange for her to be seen be radiation oncology in the near future.\par \par I discussed systemic therapeutic options which include checkpoint blockade, either single agent anti-PD1 therapy, nivolumab/relatlimab, or ipilimumab/nivolumab. I reviewed the available real world data suggesting equivalent efficacy between single agent anti-PD1 therapy and ipilimumab/nivolumab. I discussed the limited data available for relatlimab/nivolumab in this setting. I also discussed the data suggesting that the addition of anti-angiogentic therapy to checkpoint blockade may improve efficacy. I also discussed the potential targeted therapeutic options.\par \par After a long discussion, all of the patient's questions and concerns were answered to her satisfaction. We will optimize pain control and plan to begin therapy with nivolumab/relatlimab next week.\par

## 2023-06-29 NOTE — HISTORY OF PRESENT ILLNESS
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  She is currently living in [place] with her daughter.  Please see my initial consultation note for her complex prior history.\par \par Briefly, she [place] in [place] from [facility] and then presented to the ED on 05/30/2023 for pain with urination as well as a vaginal mass.  An abdominal and pelvic CT scan, performed on 05/30/2023, demonstrated a lobulated 2.0 cm left inguinal node and a 1.5 cm right inguinal node, as well as an indeterminate 2.6 cm left adrenal nodule. Transabdominal and translabial sonogram demonstrated a 1.2 x 0.6 x 0.5 cm lesion protruding from the vaginal opening.\par \ashanti Tijerina ultimately underwent a biopsy on 06/08/2023, with pathology consistent with an at least 4.1 mm ulcerated melanoma, with positive peripheral margins. There are 4 mitoses per mm2.  There is no lymphovascular invasion or neurotropism.  There are nonbrisk TILS.   She was seen by Dr. Gary Goldberg of gynecologic oncology on 06/21/2023, and then was referred to this clinic for further management.\par \par She is scheduled for an ultrasound guided biopsy of the regional nodes, a brain MRI, a PET/CT scan, as well as evaluation by radiation oncology.  Since she was last seen here, she has not taken her pain medication regularly and continues to have pain at her tumor site.\par \par PAST MEDICAL HISTORY:\par 1. Hypertension\par 2. Arthritis of the bilateral knees\par 3. status post knee surgery\par \par SOCIAL HISTORY:\par She had 7 children, 5 of whom are alive.  She is currently residing with her daughter in [place].\par \par ALLERGIES:  None

## 2023-06-30 ENCOUNTER — RESULT REVIEW (OUTPATIENT)
Age: 78
End: 2023-06-30

## 2023-06-30 ENCOUNTER — OUTPATIENT (OUTPATIENT)
Dept: OUTPATIENT SERVICES | Facility: HOSPITAL | Age: 78
LOS: 1 days | End: 2023-06-30
Payer: COMMERCIAL

## 2023-06-30 ENCOUNTER — APPOINTMENT (OUTPATIENT)
Dept: ULTRASOUND IMAGING | Facility: IMAGING CENTER | Age: 78
End: 2023-06-30
Payer: SELF-PAY

## 2023-06-30 DIAGNOSIS — C52 MALIGNANT NEOPLASM OF VAGINA: ICD-10-CM

## 2023-06-30 PROCEDURE — 88173 CYTOPATH EVAL FNA REPORT: CPT | Mod: 26

## 2023-06-30 PROCEDURE — 76942 ECHO GUIDE FOR BIOPSY: CPT

## 2023-06-30 PROCEDURE — 88172 CYTP DX EVAL FNA 1ST EA SITE: CPT

## 2023-06-30 PROCEDURE — 76942 ECHO GUIDE FOR BIOPSY: CPT | Mod: 26,76

## 2023-06-30 PROCEDURE — 88305 TISSUE EXAM BY PATHOLOGIST: CPT

## 2023-06-30 PROCEDURE — 88173 CYTOPATH EVAL FNA REPORT: CPT

## 2023-06-30 PROCEDURE — 38505 NEEDLE BIOPSY LYMPH NODES: CPT

## 2023-06-30 PROCEDURE — 88305 TISSUE EXAM BY PATHOLOGIST: CPT | Mod: 26

## 2023-06-30 PROCEDURE — 38505 NEEDLE BIOPSY LYMPH NODES: CPT | Mod: LT

## 2023-07-05 ENCOUNTER — APPOINTMENT (OUTPATIENT)
Dept: NUCLEAR MEDICINE | Facility: IMAGING CENTER | Age: 78
End: 2023-07-05

## 2023-07-05 ENCOUNTER — RESULT REVIEW (OUTPATIENT)
Age: 78
End: 2023-07-05

## 2023-07-05 LAB — NON-GYNECOLOGICAL CYTOLOGY STUDY: SIGNIFICANT CHANGE UP

## 2023-07-06 ENCOUNTER — RESULT REVIEW (OUTPATIENT)
Age: 78
End: 2023-07-06

## 2023-07-06 ENCOUNTER — APPOINTMENT (OUTPATIENT)
Dept: INFUSION THERAPY | Facility: HOSPITAL | Age: 78
End: 2023-07-06

## 2023-07-06 ENCOUNTER — APPOINTMENT (OUTPATIENT)
Dept: MRI IMAGING | Facility: CLINIC | Age: 78
End: 2023-07-06

## 2023-07-06 ENCOUNTER — OUTPATIENT (OUTPATIENT)
Dept: OUTPATIENT SERVICES | Facility: HOSPITAL | Age: 78
LOS: 1 days | End: 2023-07-06
Payer: COMMERCIAL

## 2023-07-06 DIAGNOSIS — C52 MALIGNANT NEOPLASM OF VAGINA: ICD-10-CM

## 2023-07-06 PROBLEM — I10 HYPERTENSION: Status: ACTIVE | Noted: 2023-07-06

## 2023-07-06 LAB
ALBUMIN SERPL ELPH-MCNC: 4.6 G/DL — SIGNIFICANT CHANGE UP (ref 3.3–5)
ALP SERPL-CCNC: 72 U/L — SIGNIFICANT CHANGE UP (ref 40–120)
ALT FLD-CCNC: 19 U/L — SIGNIFICANT CHANGE UP (ref 10–45)
ANION GAP SERPL CALC-SCNC: 10 MMOL/L — SIGNIFICANT CHANGE UP (ref 5–17)
AST SERPL-CCNC: 27 U/L — SIGNIFICANT CHANGE UP (ref 10–40)
BASOPHILS # BLD AUTO: 0.04 K/UL — SIGNIFICANT CHANGE UP (ref 0–0.2)
BASOPHILS NFR BLD AUTO: 0.6 % — SIGNIFICANT CHANGE UP (ref 0–2)
BILIRUB SERPL-MCNC: 0.4 MG/DL — SIGNIFICANT CHANGE UP (ref 0.2–1.2)
BUN SERPL-MCNC: 16 MG/DL — SIGNIFICANT CHANGE UP (ref 7–23)
CALCIUM SERPL-MCNC: 9.5 MG/DL — SIGNIFICANT CHANGE UP (ref 8.4–10.5)
CHLORIDE SERPL-SCNC: 99 MMOL/L — SIGNIFICANT CHANGE UP (ref 96–108)
CO2 SERPL-SCNC: 31 MMOL/L — SIGNIFICANT CHANGE UP (ref 22–31)
CREAT SERPL-MCNC: 0.72 MG/DL — SIGNIFICANT CHANGE UP (ref 0.5–1.3)
EGFR: 85 ML/MIN/1.73M2 — SIGNIFICANT CHANGE UP
EOSINOPHIL # BLD AUTO: 0.52 K/UL — HIGH (ref 0–0.5)
EOSINOPHIL NFR BLD AUTO: 7.3 % — HIGH (ref 0–6)
GLUCOSE SERPL-MCNC: 100 MG/DL — HIGH (ref 70–99)
HCT VFR BLD CALC: 42.6 % — SIGNIFICANT CHANGE UP (ref 34.5–45)
HGB BLD-MCNC: 14.1 G/DL — SIGNIFICANT CHANGE UP (ref 11.5–15.5)
IMM GRANULOCYTES NFR BLD AUTO: 0.8 % — SIGNIFICANT CHANGE UP (ref 0–0.9)
LDH SERPL L TO P-CCNC: 185 U/L — SIGNIFICANT CHANGE UP (ref 50–242)
LYMPHOCYTES # BLD AUTO: 1.82 K/UL — SIGNIFICANT CHANGE UP (ref 1–3.3)
LYMPHOCYTES # BLD AUTO: 25.5 % — SIGNIFICANT CHANGE UP (ref 13–44)
MCHC RBC-ENTMCNC: 30.5 PG — SIGNIFICANT CHANGE UP (ref 27–34)
MCHC RBC-ENTMCNC: 33.1 G/DL — SIGNIFICANT CHANGE UP (ref 32–36)
MCV RBC AUTO: 92 FL — SIGNIFICANT CHANGE UP (ref 80–100)
MONOCYTES # BLD AUTO: 0.67 K/UL — SIGNIFICANT CHANGE UP (ref 0–0.9)
MONOCYTES NFR BLD AUTO: 9.4 % — SIGNIFICANT CHANGE UP (ref 2–14)
NEUTROPHILS # BLD AUTO: 4.03 K/UL — SIGNIFICANT CHANGE UP (ref 1.8–7.4)
NEUTROPHILS NFR BLD AUTO: 56.4 % — SIGNIFICANT CHANGE UP (ref 43–77)
NRBC # BLD: 0 /100 WBCS — SIGNIFICANT CHANGE UP (ref 0–0)
PLATELET # BLD AUTO: 328 K/UL — SIGNIFICANT CHANGE UP (ref 150–400)
POTASSIUM SERPL-MCNC: 4.5 MMOL/L — SIGNIFICANT CHANGE UP (ref 3.5–5.3)
POTASSIUM SERPL-SCNC: 4.5 MMOL/L — SIGNIFICANT CHANGE UP (ref 3.5–5.3)
PROT SERPL-MCNC: 7.5 G/DL — SIGNIFICANT CHANGE UP (ref 6–8.3)
RBC # BLD: 4.63 M/UL — SIGNIFICANT CHANGE UP (ref 3.8–5.2)
RBC # FLD: 13.6 % — SIGNIFICANT CHANGE UP (ref 10.3–14.5)
SODIUM SERPL-SCNC: 141 MMOL/L — SIGNIFICANT CHANGE UP (ref 135–145)
TSH SERPL-MCNC: 6.92 UIU/ML — HIGH (ref 0.27–4.2)
WBC # BLD: 7.14 K/UL — SIGNIFICANT CHANGE UP (ref 3.8–10.5)
WBC # FLD AUTO: 7.14 K/UL — SIGNIFICANT CHANGE UP (ref 3.8–10.5)

## 2023-07-06 PROCEDURE — 70553 MRI BRAIN STEM W/O & W/DYE: CPT | Mod: 26

## 2023-07-06 PROCEDURE — 70553 MRI BRAIN STEM W/O & W/DYE: CPT

## 2023-07-06 PROCEDURE — A9585: CPT

## 2023-07-09 ENCOUNTER — APPOINTMENT (OUTPATIENT)
Dept: NUCLEAR MEDICINE | Facility: IMAGING CENTER | Age: 78
End: 2023-07-09

## 2023-07-12 ENCOUNTER — APPOINTMENT (OUTPATIENT)
Dept: INTERNAL MEDICINE | Facility: CLINIC | Age: 78
End: 2023-07-12

## 2023-07-14 ENCOUNTER — APPOINTMENT (OUTPATIENT)
Dept: RADIATION ONCOLOGY | Facility: CLINIC | Age: 78
End: 2023-07-14
Payer: COMMERCIAL

## 2023-07-14 ENCOUNTER — APPOINTMENT (OUTPATIENT)
Dept: UROLOGY | Facility: CLINIC | Age: 78
End: 2023-07-14

## 2023-07-14 VITALS
OXYGEN SATURATION: 92 % | BODY MASS INDEX: 34.66 KG/M2 | DIASTOLIC BLOOD PRESSURE: 93 MMHG | HEIGHT: 58 IN | WEIGHT: 165.12 LBS | TEMPERATURE: 97.52 F | HEART RATE: 70 BPM | SYSTOLIC BLOOD PRESSURE: 166 MMHG | RESPIRATION RATE: 16 BRPM

## 2023-07-14 DIAGNOSIS — R10.2 PELVIC AND PERINEAL PAIN: ICD-10-CM

## 2023-07-14 PROCEDURE — 99204 OFFICE O/P NEW MOD 45 MIN: CPT | Mod: 25

## 2023-07-14 RX ORDER — GABAPENTIN 100 MG/1
100 CAPSULE ORAL
Qty: 60 | Refills: 2 | Status: ACTIVE | COMMUNITY
Start: 2023-07-14 | End: 1900-01-01

## 2023-07-16 ENCOUNTER — APPOINTMENT (OUTPATIENT)
Dept: NUCLEAR MEDICINE | Facility: IMAGING CENTER | Age: 78
End: 2023-07-16
Payer: COMMERCIAL

## 2023-07-16 ENCOUNTER — OUTPATIENT (OUTPATIENT)
Dept: OUTPATIENT SERVICES | Facility: HOSPITAL | Age: 78
LOS: 1 days | End: 2023-07-16
Payer: COMMERCIAL

## 2023-07-16 DIAGNOSIS — C52 MALIGNANT NEOPLASM OF VAGINA: ICD-10-CM

## 2023-07-16 PROCEDURE — A9552: CPT

## 2023-07-16 PROCEDURE — 78816 PET IMAGE W/CT FULL BODY: CPT | Mod: 26,PI

## 2023-07-16 PROCEDURE — 78816 PET IMAGE W/CT FULL BODY: CPT

## 2023-07-17 ENCOUNTER — OUTPATIENT (OUTPATIENT)
Dept: OUTPATIENT SERVICES | Facility: HOSPITAL | Age: 78
LOS: 1 days | Discharge: HOME | End: 2023-07-17
Payer: COMMERCIAL

## 2023-07-17 PROBLEM — R10.2 PELVIC PAIN IN FEMALE: Status: RESOLVED | Noted: 2023-06-21 | Resolved: 2023-07-21

## 2023-07-17 PROCEDURE — 77263 THER RADIOLOGY TX PLNG CPLX: CPT

## 2023-07-17 NOTE — PHYSICAL EXAM
[Abdomen Soft] : soft [FreeTextEntry1] : not done [de-identified] : purple black lesion visisbleleft labia minora/clitoris extendindin suburethrally aand along anterior vaginal wall, i think for the lwer half of the vagina but exam is limited to only digital eaxmination [de-identified] : bilateral mobile inguinal adenopathy

## 2023-07-17 NOTE — VITALS
[Maximal Pain Intensity: 10/10] : 10/10 [Date: ____________] : Patient's last distress assessment performed on [unfilled]. [10 - Extreme Distress] : Distress Level: 10 [Referred Patient  to social work for follow-up] : Patient was referred to social work for follow-up [70: Cares for self; unalbe to carry on normal activity or do active work.] : 70: Cares for self; unable to carry on normal activity or do active work.

## 2023-07-17 NOTE — DISEASE MANAGEMENT
[Clinical] : TNM Stage: c [FreeTextEntry4] : vaginal melanoma [TTNM] : 3 [NTNM] : 1 [MTNM] : 0 [III] : III

## 2023-07-17 NOTE — HISTORY OF PRESENT ILLNESS
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 Her daughter was present throughout the consult and acted as .\par Her oncologic history is as follows:\par \par She was seen in ED on 5/31 with complaints of severe pain with urination and a vaginal mass. [place] [facility]. For the past couple of months, she has been experiencing vaginal pain, which has been worsening. She also reports new urinary symptoms including urinary frequency and dysuria. \par \par CT A/P  demonstrated a lobulated 2.0 cm left inguinal node and a 1.5 cm right inguinal node, as well as an indeterminate 2.6 cm left adrenal nodule. Transabdominal and translabial sonogram demonstrated a 1.2 x 0.6 x 0.5 cm lesion protruding from the vaginal opening.\par She was referred for gyn evaluation and saw WALDO Monk on6/8/2023.\par Exam [id number] anterior vaginal wall mass dark purple and necrotic without active bleeding with 3cm mass onlt labia minora. Pediatric speculum could not be passed. Punch bx done and confirmed melanoma.  \par She was seen by Gyn Oncology on 6.212.23 with mass described as  involving the distal  2cm urethra, vulva, and distal vagina with bilateral involvement of inguinal nodes\par she was referred to Dr. Jose Ch whom she saw on 6/23/23. arrangements for staging(which has not be concluded) were made as wll as a discussion of systemic options.\par 6/30/2023- She underwent b/l inguinal lymph node Bx, path c/w with metastatic melanoma in both the lymphs. \par 7/7/2023- MRI- brain-No acute hemorrhage mass mass effect or abnormal enhancement is seen. \par Vulvar biopsy (6/8/23): Malignant melanoma with ulceration, nodular type, Breslow thickness at least 4.1mm, involving the peripheral biopsy margins. T classification: pT4b. Molecular tumor profiling pending.\par She has began systemic treatment last week.\par Vulvar biopsy (6/8/23): Malignant melanoma with ulceration, nodular type, Breslow thickness at least 4.1mm, involving the peripheral biopsy margins. T classification: pT4b. Molecular tumor profiling pending.\par \par \par \par 7/14/2023- She presents today with her two daughters, Klaudia one of them speaks English and patient preferred her to translate. - will obtain records.Today she reports burning upon urination, pain at the vulva region site for which she is prescribed Oxycodone with temporary relief. Pain can be 10/10\par She has no bleeding and continues to pass her urine.\par  no

## 2023-07-17 NOTE — REVIEW OF SYSTEMS
[Chills] : chills [Night Sweats] : night sweats [Fatigue] : fatigue [Constipation] : constipation [Negative] : Heme/Lymph [FreeTextEntry8] : burning upon urination

## 2023-07-18 ENCOUNTER — NON-APPOINTMENT (OUTPATIENT)
Age: 78
End: 2023-07-18

## 2023-07-20 ENCOUNTER — NON-APPOINTMENT (OUTPATIENT)
Age: 78
End: 2023-07-20

## 2023-07-28 ENCOUNTER — NON-APPOINTMENT (OUTPATIENT)
Age: 78
End: 2023-07-28

## 2023-08-02 PROCEDURE — 77300 RADIATION THERAPY DOSE PLAN: CPT | Mod: 26

## 2023-08-02 PROCEDURE — 77338 DESIGN MLC DEVICE FOR IMRT: CPT | Mod: 26

## 2023-08-02 PROCEDURE — 77301 RADIOTHERAPY DOSE PLAN IMRT: CPT | Mod: 26

## 2023-08-03 ENCOUNTER — APPOINTMENT (OUTPATIENT)
Dept: INFUSION THERAPY | Facility: HOSPITAL | Age: 78
End: 2023-08-03

## 2023-08-04 PROCEDURE — 77387B: CUSTOM | Mod: 26

## 2023-08-07 DIAGNOSIS — Z51.11 ENCOUNTER FOR ANTINEOPLASTIC CHEMOTHERAPY: ICD-10-CM

## 2023-08-08 ENCOUNTER — NON-APPOINTMENT (OUTPATIENT)
Age: 78
End: 2023-08-08

## 2023-08-08 DIAGNOSIS — Z51.0 ENCOUNTER FOR ANTINEOPLASTIC RADIATION THERAPY: ICD-10-CM

## 2023-08-08 DIAGNOSIS — R30.0 DYSURIA: ICD-10-CM

## 2023-08-08 PROCEDURE — 77014: CPT | Mod: 26

## 2023-08-08 NOTE — DISEASE MANAGEMENT
[Clinical] : TNM Stage: c [III] : III [FreeTextEntry4] : vaginal melanoma [TTNM] : 3 [NTNM] : 1 [MTNM] : 0 [de-identified] : 1200 [de-identified] : 3000cGy [de-identified] : Bilateral Inguinal Nodes/Vagina

## 2023-08-08 NOTE — REASON FOR VISIT
[Routine On-Treatment] : a routine on-treatment visit for [Other: ___] : [unfilled] [Family Member] : family member [Patient Declined  Services] : - None: Patient declined  services [Interpreters_FullName] : Daughter [TWNoteComboBox1] : Gibraltarian

## 2023-08-08 NOTE — HISTORY OF PRESENT ILLNESS
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 Her daughter was present throughout the consult and acted as . Her oncologic history is as follows:  She was seen in ED on 5/31 with complaints of severe pain with urination and a vaginal mass. [place] [facility]. For the past couple of months, she has been experiencing vaginal pain, which has been worsening. She also reports new urinary symptoms including urinary frequency and dysuria.   CT A/P  demonstrated a lobulated 2.0 cm left inguinal node and a 1.5 cm right inguinal node, as well as an indeterminate 2.6 cm left adrenal nodule. Transabdominal and translabial sonogram demonstrated a 1.2 x 0.6 x 0.5 cm lesion protruding from the vaginal opening. She was referred for gyn evaluation and saw WALDO Monk on6/8/2023. Exam [id number] anterior vaginal wall mass dark purple and necrotic without active bleeding with 3cm mass onlt labia minora. Pediatric speculum could not be passed. Punch bx done and confirmed melanoma.   She was seen by Gyn Oncology on 6.212.23 with mass described as  involving the distal  2cm urethra, vulva, and distal vagina with bilateral involvement of inguinal nodes she was referred to Dr. Jose Ch whom she saw on 6/23/23. arrangements for staging(which has not be concluded) were made as wll as a discussion of systemic options. 6/30/2023- She underwent b/l inguinal lymph node Bx, path c/w with metastatic melanoma in both the lymphs.  7/7/2023- MRI- brain-No acute hemorrhage mass mass effect or abnormal enhancement is seen.  Vulvar biopsy (6/8/23): Malignant melanoma with ulceration, nodular type, Breslow thickness at least 4.1mm, involving the peripheral biopsy margins. T classification: pT4b. Molecular tumor profiling pending. She has began systemic treatment last week. Vulvar biopsy (6/8/23): Malignant melanoma with ulceration, nodular type, Breslow thickness at least 4.1mm, involving the peripheral biopsy margins. T classification: pT4b. Molecular tumor profiling pending.    7/14/2023- She presents today with her two daughters, Klaudia one of them speaks English and patient preferred her to translate. - will obtain records.Today she reports burning upon urination, pain at the vulva region site for which she is prescribed Oxycodone with temporary relief. Pain can be 10/10 She has no bleeding and continues to pass her urine.  8/8/2023: She is here on OTV, completes 2 out of 5 fractions 1200/3000cGy today for the treatment of vaginal melanoma, to the bilateral inguinal nodes, vulva, and vagina. She reports improvement in vaginal bleeding. Last week,she saturated a pad. this week, however she still has some spotting. She also complained of dysuria, and frequency of urination because she is unable to empty bladder when she urinates. She also reports pain with passing bowel movement with  pain/pressure in the perineum.

## 2023-08-08 NOTE — PHYSICAL EXAM
[General Appearance - Well Developed] : well developed [General Appearance - Alert] : alert [General Appearance - In No Acute Distress] : in no acute distress [] : no respiratory distress [Heart Rate And Rhythm] : heart rate and rhythm were normal [Heart Sounds] : normal S1 and S2 [Normal] : normoactive bowel sounds, soft and nontender, no hepatosplenomegaly or masses appreciated [Bowel Sounds] : normal bowel sounds [de-identified] : Mild tenderness in lower abdominal quadrants  [de-identified] : visible purplish suburethral mass [de-identified] : bilateral inguinal adenopathy and subcutaneous lesion on left mons

## 2023-08-08 NOTE — VITALS
[Maximal Pain Intensity: 10/10] : 10/10 [60: Up and around, but minimal active play; keeps busy with quieter activities.] : 60: Up and around, but minimal active play; keeps busy with quieter activities.

## 2023-08-11 ENCOUNTER — APPOINTMENT (OUTPATIENT)
Dept: INFUSION THERAPY | Facility: HOSPITAL | Age: 78
End: 2023-08-11

## 2023-08-11 PROCEDURE — 77014: CPT | Mod: 26

## 2023-08-15 ENCOUNTER — NON-APPOINTMENT (OUTPATIENT)
Age: 78
End: 2023-08-15

## 2023-08-15 ENCOUNTER — LABORATORY RESULT (OUTPATIENT)
Age: 78
End: 2023-08-15

## 2023-08-15 VITALS
RESPIRATION RATE: 16 BRPM | OXYGEN SATURATION: 93 % | DIASTOLIC BLOOD PRESSURE: 79 MMHG | BODY MASS INDEX: 34.9 KG/M2 | WEIGHT: 167 LBS | HEART RATE: 70 BPM | SYSTOLIC BLOOD PRESSURE: 170 MMHG

## 2023-08-15 LAB
APPEARANCE: ABNORMAL
BILIRUBIN URINE: NEGATIVE
BLOOD URINE: ABNORMAL
COLOR: YELLOW
GLUCOSE QUALITATIVE U: NEGATIVE MG/DL
KETONES URINE: NEGATIVE MG/DL
LEUKOCYTE ESTERASE URINE: ABNORMAL
NITRITE URINE: POSITIVE
PH URINE: 6.5
PROTEIN URINE: 100 MG/DL
SPECIFIC GRAVITY URINE: 1.01
UROBILINOGEN URINE: 0.2 MG/DL

## 2023-08-15 PROCEDURE — 77014: CPT | Mod: 26

## 2023-08-15 NOTE — VITALS
[Least Pain Intensity: 5/10] : 5/10 [Pain Description/Quality: ___] : Pain description/quality: [unfilled] [Pain Duration: ___] : Pain duration: [unfilled] [Pain Location: ___] : Pain Location: [unfilled] [Opioid] : opioid [60: Requires occasional assistance, but is able to care for most of his/her needs] : 60: Requires occasional assistance, but is able to care for most of his/her needs

## 2023-08-15 NOTE — REVIEW OF SYSTEMS
[Constipation: Grade 0] : Constipation: Grade 0 [Diarrhea: Grade 0] : Diarrhea: Grade 0 [Proctitis: Grade 1 - Rectal discomfort, intervention not indicated] : Proctitis: Grade 1 - Rectal discomfort, intervention not indicated [Fatigue: Grade 1 - Fatigue relieved by rest] : Fatigue: Grade 1 - Fatigue relieved by rest [Hematuria: Grade 0] : Hematuria: Grade 0 [Urinary Incontinence: Grade 0] : Urinary Incontinence: Grade 0  [Urinary Retention: Grade 0] : Urinary Retention: Grade 0 [Urinary Tract Pain: Grade 1 - Mild pain] : Urinary Tract Pain: Grade 1 - Mild pain [Urinary Urgency: Grade 1 - Present] : Urinary Urgency: Grade 1 - Present [Urinary Frequency: Grade 1 - Present] : Urinary Frequency: Grade 1 - Present

## 2023-08-17 ENCOUNTER — RESULT REVIEW (OUTPATIENT)
Age: 78
End: 2023-08-17

## 2023-08-17 ENCOUNTER — APPOINTMENT (OUTPATIENT)
Dept: INFUSION THERAPY | Facility: HOSPITAL | Age: 78
End: 2023-08-17

## 2023-08-17 LAB
ALBUMIN SERPL ELPH-MCNC: 4.2 G/DL — SIGNIFICANT CHANGE UP (ref 3.3–5)
ALP SERPL-CCNC: 61 U/L — SIGNIFICANT CHANGE UP (ref 40–120)
ALT FLD-CCNC: 12 U/L — SIGNIFICANT CHANGE UP (ref 10–45)
ANION GAP SERPL CALC-SCNC: 10 MMOL/L — SIGNIFICANT CHANGE UP (ref 5–17)
AST SERPL-CCNC: 35 U/L — SIGNIFICANT CHANGE UP (ref 10–40)
BASOPHILS # BLD AUTO: 0.02 K/UL — SIGNIFICANT CHANGE UP (ref 0–0.2)
BASOPHILS NFR BLD AUTO: 0.3 % — SIGNIFICANT CHANGE UP (ref 0–2)
BILIRUB SERPL-MCNC: 0.3 MG/DL — SIGNIFICANT CHANGE UP (ref 0.2–1.2)
BUN SERPL-MCNC: 10 MG/DL — SIGNIFICANT CHANGE UP (ref 7–23)
CALCIUM SERPL-MCNC: 9.2 MG/DL — SIGNIFICANT CHANGE UP (ref 8.4–10.5)
CHLORIDE SERPL-SCNC: 97 MMOL/L — SIGNIFICANT CHANGE UP (ref 96–108)
CO2 SERPL-SCNC: 29 MMOL/L — SIGNIFICANT CHANGE UP (ref 22–31)
CREAT SERPL-MCNC: 0.53 MG/DL — SIGNIFICANT CHANGE UP (ref 0.5–1.3)
EGFR: 94 ML/MIN/1.73M2 — SIGNIFICANT CHANGE UP
EOSINOPHIL # BLD AUTO: 0.56 K/UL — HIGH (ref 0–0.5)
EOSINOPHIL NFR BLD AUTO: 8.8 % — HIGH (ref 0–6)
GLUCOSE SERPL-MCNC: 97 MG/DL — SIGNIFICANT CHANGE UP (ref 70–99)
HCT VFR BLD CALC: 38.4 % — SIGNIFICANT CHANGE UP (ref 34.5–45)
HGB BLD-MCNC: 12.8 G/DL — SIGNIFICANT CHANGE UP (ref 11.5–15.5)
IMM GRANULOCYTES NFR BLD AUTO: 0.3 % — SIGNIFICANT CHANGE UP (ref 0–0.9)
LDH SERPL L TO P-CCNC: 220 U/L — SIGNIFICANT CHANGE UP (ref 50–242)
LYMPHOCYTES # BLD AUTO: 1.23 K/UL — SIGNIFICANT CHANGE UP (ref 1–3.3)
LYMPHOCYTES # BLD AUTO: 19.4 % — SIGNIFICANT CHANGE UP (ref 13–44)
MCHC RBC-ENTMCNC: 30.8 PG — SIGNIFICANT CHANGE UP (ref 27–34)
MCHC RBC-ENTMCNC: 33.3 G/DL — SIGNIFICANT CHANGE UP (ref 32–36)
MCV RBC AUTO: 92.5 FL — SIGNIFICANT CHANGE UP (ref 80–100)
MONOCYTES # BLD AUTO: 0.9 K/UL — SIGNIFICANT CHANGE UP (ref 0–0.9)
MONOCYTES NFR BLD AUTO: 14.2 % — HIGH (ref 2–14)
NEUTROPHILS # BLD AUTO: 3.6 K/UL — SIGNIFICANT CHANGE UP (ref 1.8–7.4)
NEUTROPHILS NFR BLD AUTO: 57 % — SIGNIFICANT CHANGE UP (ref 43–77)
NRBC # BLD: 0 /100 WBCS — SIGNIFICANT CHANGE UP (ref 0–0)
PLATELET # BLD AUTO: 296 K/UL — SIGNIFICANT CHANGE UP (ref 150–400)
POTASSIUM SERPL-MCNC: 4.3 MMOL/L — SIGNIFICANT CHANGE UP (ref 3.5–5.3)
POTASSIUM SERPL-SCNC: 4.3 MMOL/L — SIGNIFICANT CHANGE UP (ref 3.5–5.3)
PROT SERPL-MCNC: 7.2 G/DL — SIGNIFICANT CHANGE UP (ref 6–8.3)
RBC # BLD: 4.15 M/UL — SIGNIFICANT CHANGE UP (ref 3.8–5.2)
RBC # FLD: 13.8 % — SIGNIFICANT CHANGE UP (ref 10.3–14.5)
SODIUM SERPL-SCNC: 136 MMOL/L — SIGNIFICANT CHANGE UP (ref 135–145)
TSH SERPL-MCNC: 6.13 UIU/ML — HIGH (ref 0.27–4.2)
WBC # BLD: 6.33 K/UL — SIGNIFICANT CHANGE UP (ref 3.8–10.5)
WBC # FLD AUTO: 6.33 K/UL — SIGNIFICANT CHANGE UP (ref 3.8–10.5)

## 2023-08-17 NOTE — PHYSICAL EXAM
[Normal] : normal external genitalia [Normal] : oriented to person, place and time, the affect was normal, the mood was normal and not anxious [] : no respiratory distress [de-identified] : visible tomor at periurethral area with tumor palpable along anterior vaginal wall, distal half [de-identified] : Palpable bilateral  inguinal  nodes  [de-identified] : Erythema in inguinal folds

## 2023-08-17 NOTE — ADDENDUM
[FreeTextEntry1] : UA>100,000 ecoli sensitivity pending Rx for Keflex Oxycodone rx renewed Should have refills on gabapentin

## 2023-08-17 NOTE — REASON FOR VISIT
[Routine On-Treatment] : a routine on-treatment visit for [Patient Declined  Services] : - None: Patient declined  services [TWNoteComboBox1] : Sudanese

## 2023-08-17 NOTE — HISTORY OF PRESENT ILLNESS
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 She is currently receiving RT to the b/l inguinal nodes, vulva and vagina  8/8/2023: She is here on OTV, completes 2 out of 5 fractions 1200/3000cGy today for the treatment of vaginal melanoma, to the bilateral inguinal nodes, vulva, and vagina. She reports improvement in vaginal bleeding. Last week,she saturated a pad. this week, however she still has some spotting. She also complained of dysuria, and frequency of urination because she is unable to empty bladder when she urinates. She also reports pain with passing bowel movement with pain/pressure in the perineum.  8/15/23: Tolerating treatment. Completes 4 out of 5 treatments She reports pain to the vulva/vagina and taking Oxycodone 10mg every 4 hours which helps. Decreased appetite. Continues to experience dysuria. Has pain/pressure in the perineum with bowel movements. Patient reports continued vaginal spotting. She has a appointment for immunotherapy infusion on Thursday

## 2023-08-17 NOTE — DISEASE MANAGEMENT
[Clinical] : TNM Stage: c [III] : III [TTNM] : 3 [NTNM] : 1 [MTNM] : 0 [de-identified] : 3561 [de-identified] : 6351 [de-identified] : bilateral inguinal nodes/vagina

## 2023-08-18 DIAGNOSIS — N30.00 ACUTE CYSTITIS W/OUT HEMATURIA: ICD-10-CM

## 2023-08-18 PROCEDURE — 77427 RADIATION TX MANAGEMENT X5: CPT

## 2023-08-18 PROCEDURE — 77014: CPT | Mod: 26

## 2023-08-18 RX ORDER — AMOXICILLIN AND CLAVULANATE POTASSIUM 500; 125 MG/1; MG/1
500-125 TABLET, FILM COATED ORAL 3 TIMES DAILY
Qty: 15 | Refills: 1 | Status: ACTIVE | COMMUNITY
Start: 2023-08-18 | End: 1900-01-01

## 2023-08-21 ENCOUNTER — NON-APPOINTMENT (OUTPATIENT)
Age: 78
End: 2023-08-21

## 2023-08-21 RX ORDER — OXYCODONE 5 MG/1
5 TABLET ORAL
Qty: 90 | Refills: 0 | Status: DISCONTINUED | COMMUNITY
Start: 2023-08-17 | End: 2023-08-21

## 2023-08-22 ENCOUNTER — APPOINTMENT (OUTPATIENT)
Dept: HEMATOLOGY ONCOLOGY | Facility: CLINIC | Age: 78
End: 2023-08-22

## 2023-08-22 ENCOUNTER — RESULT REVIEW (OUTPATIENT)
Age: 78
End: 2023-08-22

## 2023-08-22 LAB
BASOPHILS # BLD AUTO: 0.05 K/UL — SIGNIFICANT CHANGE UP (ref 0–0.2)
BASOPHILS NFR BLD AUTO: 0.7 % — SIGNIFICANT CHANGE UP (ref 0–2)
EOSINOPHIL # BLD AUTO: 0.55 K/UL — HIGH (ref 0–0.5)
EOSINOPHIL NFR BLD AUTO: 8.2 % — HIGH (ref 0–6)
HCT VFR BLD CALC: 40 % — SIGNIFICANT CHANGE UP (ref 34.5–45)
HGB BLD-MCNC: 13.2 G/DL — SIGNIFICANT CHANGE UP (ref 11.5–15.5)
IMM GRANULOCYTES NFR BLD AUTO: 0.6 % — SIGNIFICANT CHANGE UP (ref 0–0.9)
LYMPHOCYTES # BLD AUTO: 0.8 K/UL — LOW (ref 1–3.3)
LYMPHOCYTES # BLD AUTO: 12 % — LOW (ref 13–44)
MCHC RBC-ENTMCNC: 30.8 PG — SIGNIFICANT CHANGE UP (ref 27–34)
MCHC RBC-ENTMCNC: 33 G/DL — SIGNIFICANT CHANGE UP (ref 32–36)
MCV RBC AUTO: 93.2 FL — SIGNIFICANT CHANGE UP (ref 80–100)
MONOCYTES # BLD AUTO: 0.63 K/UL — SIGNIFICANT CHANGE UP (ref 0–0.9)
MONOCYTES NFR BLD AUTO: 9.4 % — SIGNIFICANT CHANGE UP (ref 2–14)
NEUTROPHILS # BLD AUTO: 4.61 K/UL — SIGNIFICANT CHANGE UP (ref 1.8–7.4)
NEUTROPHILS NFR BLD AUTO: 69.1 % — SIGNIFICANT CHANGE UP (ref 43–77)
NRBC # BLD: 0 /100 WBCS — SIGNIFICANT CHANGE UP (ref 0–0)
PLATELET # BLD AUTO: 264 K/UL — SIGNIFICANT CHANGE UP (ref 150–400)
RBC # BLD: 4.29 M/UL — SIGNIFICANT CHANGE UP (ref 3.8–5.2)
RBC # FLD: 13.5 % — SIGNIFICANT CHANGE UP (ref 10.3–14.5)
WBC # BLD: 6.68 K/UL — SIGNIFICANT CHANGE UP (ref 3.8–10.5)
WBC # FLD AUTO: 6.68 K/UL — SIGNIFICANT CHANGE UP (ref 3.8–10.5)

## 2023-08-23 LAB
ALBUMIN SERPL ELPH-MCNC: 4 G/DL
ALP BLD-CCNC: 71 U/L
ALT SERPL-CCNC: 20 U/L
ANION GAP SERPL CALC-SCNC: 15 MMOL/L
AST SERPL-CCNC: 24 U/L
BILIRUB SERPL-MCNC: 0.3 MG/DL
BUN SERPL-MCNC: 11 MG/DL
CALCIUM SERPL-MCNC: 8.8 MG/DL
CHLORIDE SERPL-SCNC: 96 MMOL/L
CO2 SERPL-SCNC: 24 MMOL/L
CREAT SERPL-MCNC: 0.66 MG/DL
EGFR: 89 ML/MIN/1.73M2
GLUCOSE SERPL-MCNC: 187 MG/DL
LDH SERPL-CCNC: 169 U/L
POTASSIUM SERPL-SCNC: 4.1 MMOL/L
PROT SERPL-MCNC: 7.1 G/DL
SODIUM SERPL-SCNC: 135 MMOL/L
TSH SERPL-ACNC: 4.04 UIU/ML

## 2023-08-28 ENCOUNTER — OUTPATIENT (OUTPATIENT)
Dept: OUTPATIENT SERVICES | Facility: HOSPITAL | Age: 78
LOS: 1 days | Discharge: HOME | End: 2023-08-28

## 2023-08-28 DIAGNOSIS — C43.31 MALIGNANT MELANOMA OF NOSE: ICD-10-CM

## 2023-08-29 ENCOUNTER — RESULT REVIEW (OUTPATIENT)
Age: 78
End: 2023-08-29

## 2023-08-29 ENCOUNTER — APPOINTMENT (OUTPATIENT)
Dept: HEMATOLOGY ONCOLOGY | Facility: CLINIC | Age: 78
End: 2023-08-29

## 2023-08-29 LAB
BASOPHILS # BLD AUTO: 0.04 K/UL — SIGNIFICANT CHANGE UP (ref 0–0.2)
BASOPHILS NFR BLD AUTO: 0.7 % — SIGNIFICANT CHANGE UP (ref 0–2)
EOSINOPHIL # BLD AUTO: 0.49 K/UL — SIGNIFICANT CHANGE UP (ref 0–0.5)
EOSINOPHIL NFR BLD AUTO: 8.1 % — HIGH (ref 0–6)
HCT VFR BLD CALC: 39.1 % — SIGNIFICANT CHANGE UP (ref 34.5–45)
HGB BLD-MCNC: 13.1 G/DL — SIGNIFICANT CHANGE UP (ref 11.5–15.5)
IMM GRANULOCYTES NFR BLD AUTO: 0.8 % — SIGNIFICANT CHANGE UP (ref 0–0.9)
LYMPHOCYTES # BLD AUTO: 1.02 K/UL — SIGNIFICANT CHANGE UP (ref 1–3.3)
LYMPHOCYTES # BLD AUTO: 16.8 % — SIGNIFICANT CHANGE UP (ref 13–44)
MCHC RBC-ENTMCNC: 31 PG — SIGNIFICANT CHANGE UP (ref 27–34)
MCHC RBC-ENTMCNC: 33.5 G/DL — SIGNIFICANT CHANGE UP (ref 32–36)
MCV RBC AUTO: 92.7 FL — SIGNIFICANT CHANGE UP (ref 80–100)
MONOCYTES # BLD AUTO: 0.73 K/UL — SIGNIFICANT CHANGE UP (ref 0–0.9)
MONOCYTES NFR BLD AUTO: 12 % — SIGNIFICANT CHANGE UP (ref 2–14)
NEUTROPHILS # BLD AUTO: 3.75 K/UL — SIGNIFICANT CHANGE UP (ref 1.8–7.4)
NEUTROPHILS NFR BLD AUTO: 61.6 % — SIGNIFICANT CHANGE UP (ref 43–77)
NRBC # BLD: 0 /100 WBCS — SIGNIFICANT CHANGE UP (ref 0–0)
PLATELET # BLD AUTO: 273 K/UL — SIGNIFICANT CHANGE UP (ref 150–400)
RBC # BLD: 4.22 M/UL — SIGNIFICANT CHANGE UP (ref 3.8–5.2)
RBC # FLD: 13.6 % — SIGNIFICANT CHANGE UP (ref 10.3–14.5)
WBC # BLD: 6.08 K/UL — SIGNIFICANT CHANGE UP (ref 3.8–10.5)
WBC # FLD AUTO: 6.08 K/UL — SIGNIFICANT CHANGE UP (ref 3.8–10.5)

## 2023-08-31 ENCOUNTER — APPOINTMENT (OUTPATIENT)
Dept: HEMATOLOGY ONCOLOGY | Facility: CLINIC | Age: 78
End: 2023-08-31
Payer: COMMERCIAL

## 2023-08-31 VITALS
TEMPERATURE: 96.4 F | DIASTOLIC BLOOD PRESSURE: 82 MMHG | OXYGEN SATURATION: 94 % | BODY MASS INDEX: 33.44 KG/M2 | HEART RATE: 76 BPM | RESPIRATION RATE: 16 BRPM | WEIGHT: 159.99 LBS | SYSTOLIC BLOOD PRESSURE: 127 MMHG

## 2023-08-31 PROCEDURE — 99214 OFFICE O/P EST MOD 30 MIN: CPT

## 2023-09-05 ENCOUNTER — RESULT REVIEW (OUTPATIENT)
Age: 78
End: 2023-09-05

## 2023-09-05 ENCOUNTER — LABORATORY RESULT (OUTPATIENT)
Age: 78
End: 2023-09-05

## 2023-09-05 ENCOUNTER — APPOINTMENT (OUTPATIENT)
Dept: HEMATOLOGY ONCOLOGY | Facility: CLINIC | Age: 78
End: 2023-09-05

## 2023-09-05 VITALS — WEIGHT: 160 LBS | BODY MASS INDEX: 33.44 KG/M2

## 2023-09-05 LAB
BASOPHILS # BLD AUTO: 0.03 K/UL — SIGNIFICANT CHANGE UP (ref 0–0.2)
BASOPHILS NFR BLD AUTO: 0.5 % — SIGNIFICANT CHANGE UP (ref 0–2)
EOSINOPHIL # BLD AUTO: 0.31 K/UL — SIGNIFICANT CHANGE UP (ref 0–0.5)
EOSINOPHIL NFR BLD AUTO: 5.7 % — SIGNIFICANT CHANGE UP (ref 0–6)
HCT VFR BLD CALC: 38.3 % — SIGNIFICANT CHANGE UP (ref 34.5–45)
HGB BLD-MCNC: 12.7 G/DL — SIGNIFICANT CHANGE UP (ref 11.5–15.5)
IMM GRANULOCYTES NFR BLD AUTO: 0.2 % — SIGNIFICANT CHANGE UP (ref 0–0.9)
LYMPHOCYTES # BLD AUTO: 1.19 K/UL — SIGNIFICANT CHANGE UP (ref 1–3.3)
LYMPHOCYTES # BLD AUTO: 21.8 % — SIGNIFICANT CHANGE UP (ref 13–44)
MCHC RBC-ENTMCNC: 31.4 PG — SIGNIFICANT CHANGE UP (ref 27–34)
MCHC RBC-ENTMCNC: 33.2 G/DL — SIGNIFICANT CHANGE UP (ref 32–36)
MCV RBC AUTO: 94.6 FL — SIGNIFICANT CHANGE UP (ref 80–100)
MONOCYTES # BLD AUTO: 0.61 K/UL — SIGNIFICANT CHANGE UP (ref 0–0.9)
MONOCYTES NFR BLD AUTO: 11.2 % — SIGNIFICANT CHANGE UP (ref 2–14)
NEUTROPHILS # BLD AUTO: 3.32 K/UL — SIGNIFICANT CHANGE UP (ref 1.8–7.4)
NEUTROPHILS NFR BLD AUTO: 60.6 % — SIGNIFICANT CHANGE UP (ref 43–77)
NRBC # BLD: 0 /100 WBCS — SIGNIFICANT CHANGE UP (ref 0–0)
PLATELET # BLD AUTO: 277 K/UL — SIGNIFICANT CHANGE UP (ref 150–400)
RBC # BLD: 4.05 M/UL — SIGNIFICANT CHANGE UP (ref 3.8–5.2)
RBC # FLD: 13.7 % — SIGNIFICANT CHANGE UP (ref 10.3–14.5)
WBC # BLD: 5.47 K/UL — SIGNIFICANT CHANGE UP (ref 3.8–10.5)
WBC # FLD AUTO: 5.47 K/UL — SIGNIFICANT CHANGE UP (ref 3.8–10.5)

## 2023-09-05 NOTE — HISTORY OF PRESENT ILLNESS
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  She is currently living in [place] with her daughter.  Please see my initial consultation note for her complex prior history.  Briefly, she came [place] the [place] in [place] from [facility] and then presented to the ED on 05/30/2023 for pain with urination as well as a vaginal mass.  An abdominal and pelvic CT scan, performed on 05/30/2023, demonstrated a lobulated 2.0 cm left inguinal node and a 1.5 cm right inguinal node, as well as an indeterminate 2.6 cm left adrenal nodule. Transabdominal and translabial sonogram demonstrated a 1.2 x 0.6 x 0.5 cm lesion protruding from the vaginal opening.  She ultimately underwent a biopsy on 06/08/2023, with pathology consistent with an at least 4.1 mm ulcerated melanoma, with positive peripheral margins. There are 4 mitoses per mm2.  There is no lymphovascular invasion or neurotropism.  There are nonbrisk TILS.   She was seen by Dr. Gary Goldberg of gynecologic oncology on 06/21/2023, and then was referred to this clinic for further management.  Since she was last seen, she has completed radiation to the vagina and C2 Opdualog which was delayed due to patient NSH on 8/3 and 8/11. She completed the second infusion on 8/17/23 and tolerated therapy well. We continue to monitor her BW for any evidence of toxicity. Pt has been experiencing significant pain 2/2 treatment effects from radiation. Urination and defecation is causing severe pain. Trial of Dilaudid was done wo improvement in discomfort. She has been using Oxycodone 10 mg RTC with only minimal relief. Daughter states she has been using topical silvadine to provide barrier protection from feces and urine, as well as, to promote healing. Daughter also reports vaginal mucosa inflammation seems slightly improved. Appetite is fair. Pt spends good portion of day sleeping or in bed as sitting up and ambulation is painful.   PAST MEDICAL HISTORY: 1. Hypertension 2. Arthritis of the bilateral knees 3. status post knee surgery  SOCIAL HISTORY: She had 7 children, 5 of whom are alive.  She is currently residing with her daughter in [place].  ALLERGIES:  None

## 2023-09-05 NOTE — ASSESSMENT
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 I again had a long discussion with the patient and her family, with her granddaughter serving as , regarding her diagnosis, prognosis and management options.   I discussed the need to complete the workup regarding extent of disease as well as molecular profiling. To this end, we will proceed with the PET/CT scan as well as brain MRI. We will likely need an MRI of the adrenal lesion which can be performed at a later date. We will arrange for an ultrasound guided biopsy of one of the inguinal notes to confirm regional involvement. We will also arrange for molecular profiling of the tumor.  Given the morbidity associated with surgery and the concern for regional and distant disease, we proceeded with  definitive radiotherapy. She completed RT and 2 cycles of Opdualog. She continues to have poorly controlled pain despite trial of dilaudid. Recommend continue using Silvadine topically. Will send rx for fentanyl patch 25mcg/h to pharmacy. Family has been instructed how to apply patch/ change [id number]. We have educated family that there is a delay in onset of analgesia and that oxycodone 10mg [id number] should be continued for at least the first 12-18 hours following placement of patch. Also advised to use Oxycodone 10mg q4-6 prn breakthrough pain. Family expressed understanding of instructions.   C3 opdualog scheduled for 9/14/23. We will continue to monitor blood weekly for evidence of treatment related toxicity. Pt will see Dr. Morris within the next 2 weeks for follow up and will see us following her next infusion. I will follow up with family early next week to reassess pain management. Family has been advised to call office for any concerns or worsening/new symptoms.

## 2023-09-05 NOTE — PHYSICAL EXAM
[Ambulatory and capable of all self care but unable to carry out any work activities] : Status 2- Ambulatory and capable of all self care but unable to carry out any work activities. Up and about more than 50% of waking hours [Normal] : full range of motion and no deformities appreciated [de-identified] : Discomfort apparent.

## 2023-09-05 NOTE — REVIEW OF SYSTEMS
[Fatigue] : fatigue [Diarrhea: Grade 0] : Diarrhea: Grade 0 [Joint Pain] : joint pain [Swollen Glands] : swollen glands [Negative] : Allergic/Immunologic [FreeTextEntry2] : Pain [FreeTextEntry8] : vaginal pain/ some discharge

## 2023-09-06 LAB
ALBUMIN SERPL ELPH-MCNC: 4.1 G/DL
ALP BLD-CCNC: 74 U/L
ALT SERPL-CCNC: 26 U/L
ANION GAP SERPL CALC-SCNC: 12 MMOL/L
AST SERPL-CCNC: 32 U/L
BILIRUB SERPL-MCNC: 0.3 MG/DL
BUN SERPL-MCNC: 12 MG/DL
CALCIUM SERPL-MCNC: 9.1 MG/DL
CHLORIDE SERPL-SCNC: 99 MMOL/L
CO2 SERPL-SCNC: 25 MMOL/L
CREAT SERPL-MCNC: 0.71 MG/DL
EGFR: 86 ML/MIN/1.73M2
GLUCOSE SERPL-MCNC: 132 MG/DL
LDH SERPL-CCNC: 164 U/L
POTASSIUM SERPL-SCNC: 4.4 MMOL/L
PROT SERPL-MCNC: 7 G/DL
SODIUM SERPL-SCNC: 136 MMOL/L
TSH SERPL-ACNC: 4.67 UIU/ML

## 2023-09-14 ENCOUNTER — RESULT REVIEW (OUTPATIENT)
Age: 78
End: 2023-09-14

## 2023-09-14 ENCOUNTER — APPOINTMENT (OUTPATIENT)
Dept: INFUSION THERAPY | Facility: HOSPITAL | Age: 78
End: 2023-09-14

## 2023-09-14 ENCOUNTER — APPOINTMENT (OUTPATIENT)
Dept: HEMATOLOGY ONCOLOGY | Facility: CLINIC | Age: 78
End: 2023-09-14
Payer: COMMERCIAL

## 2023-09-14 VITALS
BODY MASS INDEX: 33.32 KG/M2 | RESPIRATION RATE: 16 BRPM | WEIGHT: 158.73 LBS | HEART RATE: 64 BPM | TEMPERATURE: 98 F | SYSTOLIC BLOOD PRESSURE: 131 MMHG | HEIGHT: 57.99 IN | OXYGEN SATURATION: 94 % | DIASTOLIC BLOOD PRESSURE: 65 MMHG

## 2023-09-14 LAB
ALBUMIN SERPL ELPH-MCNC: 4.1 G/DL — SIGNIFICANT CHANGE UP (ref 3.3–5)
ALP SERPL-CCNC: 66 U/L — SIGNIFICANT CHANGE UP (ref 40–120)
ALT FLD-CCNC: 14 U/L — SIGNIFICANT CHANGE UP (ref 10–45)
ANION GAP SERPL CALC-SCNC: 8 MMOL/L — SIGNIFICANT CHANGE UP (ref 5–17)
AST SERPL-CCNC: 37 U/L — SIGNIFICANT CHANGE UP (ref 10–40)
BASOPHILS # BLD AUTO: 0.04 K/UL — SIGNIFICANT CHANGE UP (ref 0–0.2)
BASOPHILS NFR BLD AUTO: 0.6 % — SIGNIFICANT CHANGE UP (ref 0–2)
BILIRUB SERPL-MCNC: 0.4 MG/DL — SIGNIFICANT CHANGE UP (ref 0.2–1.2)
BUN SERPL-MCNC: 11 MG/DL — SIGNIFICANT CHANGE UP (ref 7–23)
CALCIUM SERPL-MCNC: 9 MG/DL — SIGNIFICANT CHANGE UP (ref 8.4–10.5)
CHLORIDE SERPL-SCNC: 98 MMOL/L — SIGNIFICANT CHANGE UP (ref 96–108)
CO2 SERPL-SCNC: 30 MMOL/L — SIGNIFICANT CHANGE UP (ref 22–31)
CREAT SERPL-MCNC: 0.6 MG/DL — SIGNIFICANT CHANGE UP (ref 0.5–1.3)
EGFR: 91 ML/MIN/1.73M2 — SIGNIFICANT CHANGE UP
EOSINOPHIL # BLD AUTO: 0.43 K/UL — SIGNIFICANT CHANGE UP (ref 0–0.5)
EOSINOPHIL NFR BLD AUTO: 6.5 % — HIGH (ref 0–6)
GLUCOSE SERPL-MCNC: 101 MG/DL — HIGH (ref 70–99)
HCT VFR BLD CALC: 38.7 % — SIGNIFICANT CHANGE UP (ref 34.5–45)
HGB BLD-MCNC: 12.7 G/DL — SIGNIFICANT CHANGE UP (ref 11.5–15.5)
IMM GRANULOCYTES NFR BLD AUTO: 0.3 % — SIGNIFICANT CHANGE UP (ref 0–0.9)
LDH SERPL L TO P-CCNC: 232 U/L — SIGNIFICANT CHANGE UP (ref 50–242)
LYMPHOCYTES # BLD AUTO: 1.53 K/UL — SIGNIFICANT CHANGE UP (ref 1–3.3)
LYMPHOCYTES # BLD AUTO: 23 % — SIGNIFICANT CHANGE UP (ref 13–44)
MCHC RBC-ENTMCNC: 31.1 PG — SIGNIFICANT CHANGE UP (ref 27–34)
MCHC RBC-ENTMCNC: 32.8 G/DL — SIGNIFICANT CHANGE UP (ref 32–36)
MCV RBC AUTO: 94.6 FL — SIGNIFICANT CHANGE UP (ref 80–100)
MONOCYTES # BLD AUTO: 0.72 K/UL — SIGNIFICANT CHANGE UP (ref 0–0.9)
MONOCYTES NFR BLD AUTO: 10.8 % — SIGNIFICANT CHANGE UP (ref 2–14)
NEUTROPHILS # BLD AUTO: 3.91 K/UL — SIGNIFICANT CHANGE UP (ref 1.8–7.4)
NEUTROPHILS NFR BLD AUTO: 58.8 % — SIGNIFICANT CHANGE UP (ref 43–77)
NRBC # BLD: 0 /100 WBCS — SIGNIFICANT CHANGE UP (ref 0–0)
PLATELET # BLD AUTO: 286 K/UL — SIGNIFICANT CHANGE UP (ref 150–400)
POTASSIUM SERPL-MCNC: 4.4 MMOL/L — SIGNIFICANT CHANGE UP (ref 3.5–5.3)
POTASSIUM SERPL-SCNC: 4.4 MMOL/L — SIGNIFICANT CHANGE UP (ref 3.5–5.3)
PROT SERPL-MCNC: 7.2 G/DL — SIGNIFICANT CHANGE UP (ref 6–8.3)
RBC # BLD: 4.09 M/UL — SIGNIFICANT CHANGE UP (ref 3.8–5.2)
RBC # FLD: 13.2 % — SIGNIFICANT CHANGE UP (ref 10.3–14.5)
SODIUM SERPL-SCNC: 136 MMOL/L — SIGNIFICANT CHANGE UP (ref 135–145)
TSH SERPL-MCNC: 4.61 UIU/ML — HIGH (ref 0.27–4.2)
WBC # BLD: 6.65 K/UL — SIGNIFICANT CHANGE UP (ref 3.8–10.5)
WBC # FLD AUTO: 6.65 K/UL — SIGNIFICANT CHANGE UP (ref 3.8–10.5)

## 2023-09-14 PROCEDURE — 99214 OFFICE O/P EST MOD 30 MIN: CPT

## 2023-09-14 RX ORDER — SILVER SULFADIAZINE 10 MG/G
1 CREAM TOPICAL TWICE DAILY
Qty: 1 | Refills: 0 | Status: ACTIVE | COMMUNITY
Start: 2023-09-14 | End: 1900-01-01

## 2023-09-16 ENCOUNTER — APPOINTMENT (OUTPATIENT)
Dept: MRI IMAGING | Facility: CLINIC | Age: 78
End: 2023-09-16

## 2023-09-16 ENCOUNTER — APPOINTMENT (OUTPATIENT)
Dept: CT IMAGING | Facility: CLINIC | Age: 78
End: 2023-09-16

## 2023-09-16 ENCOUNTER — OUTPATIENT (OUTPATIENT)
Dept: OUTPATIENT SERVICES | Facility: HOSPITAL | Age: 78
LOS: 1 days | End: 2023-09-16
Payer: SELF-PAY

## 2023-09-16 PROCEDURE — 71260 CT THORAX DX C+: CPT | Mod: 26

## 2023-09-16 PROCEDURE — 72197 MRI PELVIS W/O & W/DYE: CPT | Mod: 26

## 2023-09-16 PROCEDURE — 74183 MRI ABD W/O CNTR FLWD CNTR: CPT | Mod: 26

## 2023-09-19 ENCOUNTER — RESULT REVIEW (OUTPATIENT)
Age: 78
End: 2023-09-19

## 2023-09-19 ENCOUNTER — APPOINTMENT (OUTPATIENT)
Dept: HEMATOLOGY ONCOLOGY | Facility: CLINIC | Age: 78
End: 2023-09-19

## 2023-09-19 LAB
BASOPHILS # BLD AUTO: 0.02 K/UL — SIGNIFICANT CHANGE UP (ref 0–0.2)
BASOPHILS NFR BLD AUTO: 0.4 % — SIGNIFICANT CHANGE UP (ref 0–2)
EOSINOPHIL # BLD AUTO: 0.33 K/UL — SIGNIFICANT CHANGE UP (ref 0–0.5)
EOSINOPHIL NFR BLD AUTO: 6 % — SIGNIFICANT CHANGE UP (ref 0–6)
HCT VFR BLD CALC: 40.7 % — SIGNIFICANT CHANGE UP (ref 34.5–45)
HGB BLD-MCNC: 13.3 G/DL — SIGNIFICANT CHANGE UP (ref 11.5–15.5)
IMM GRANULOCYTES NFR BLD AUTO: 0.4 % — SIGNIFICANT CHANGE UP (ref 0–0.9)
LYMPHOCYTES # BLD AUTO: 1 K/UL — SIGNIFICANT CHANGE UP (ref 1–3.3)
LYMPHOCYTES # BLD AUTO: 18.1 % — SIGNIFICANT CHANGE UP (ref 13–44)
MCHC RBC-ENTMCNC: 30.9 PG — SIGNIFICANT CHANGE UP (ref 27–34)
MCHC RBC-ENTMCNC: 32.7 G/DL — SIGNIFICANT CHANGE UP (ref 32–36)
MCV RBC AUTO: 94.4 FL — SIGNIFICANT CHANGE UP (ref 80–100)
MONOCYTES # BLD AUTO: 0.59 K/UL — SIGNIFICANT CHANGE UP (ref 0–0.9)
MONOCYTES NFR BLD AUTO: 10.6 % — SIGNIFICANT CHANGE UP (ref 2–14)
NEUTROPHILS # BLD AUTO: 3.58 K/UL — SIGNIFICANT CHANGE UP (ref 1.8–7.4)
NEUTROPHILS NFR BLD AUTO: 64.5 % — SIGNIFICANT CHANGE UP (ref 43–77)
NRBC # BLD: 0 /100 WBCS — SIGNIFICANT CHANGE UP (ref 0–0)
PLATELET # BLD AUTO: 270 K/UL — SIGNIFICANT CHANGE UP (ref 150–400)
RBC # BLD: 4.31 M/UL — SIGNIFICANT CHANGE UP (ref 3.8–5.2)
RBC # FLD: 13.2 % — SIGNIFICANT CHANGE UP (ref 10.3–14.5)
WBC # BLD: 5.54 K/UL — SIGNIFICANT CHANGE UP (ref 3.8–10.5)
WBC # FLD AUTO: 5.54 K/UL — SIGNIFICANT CHANGE UP (ref 3.8–10.5)

## 2023-09-20 LAB
ALBUMIN SERPL ELPH-MCNC: 4.2 G/DL
ALP BLD-CCNC: 75 U/L
ALT SERPL-CCNC: 25 U/L
ANION GAP SERPL CALC-SCNC: 10 MMOL/L
AST SERPL-CCNC: 32 U/L
BILIRUB SERPL-MCNC: 0.5 MG/DL
BUN SERPL-MCNC: 11 MG/DL
CALCIUM SERPL-MCNC: 8.9 MG/DL
CHLORIDE SERPL-SCNC: 98 MMOL/L
CO2 SERPL-SCNC: 27 MMOL/L
CREAT SERPL-MCNC: 0.66 MG/DL
EGFR: 89 ML/MIN/1.73M2
GLUCOSE SERPL-MCNC: 110 MG/DL
POTASSIUM SERPL-SCNC: 4.1 MMOL/L
PROT SERPL-MCNC: 7.2 G/DL
SODIUM SERPL-SCNC: 136 MMOL/L

## 2023-09-26 ENCOUNTER — APPOINTMENT (OUTPATIENT)
Dept: HEMATOLOGY ONCOLOGY | Facility: CLINIC | Age: 78
End: 2023-09-26

## 2023-09-26 ENCOUNTER — RESULT REVIEW (OUTPATIENT)
Age: 78
End: 2023-09-26

## 2023-09-26 LAB
BASOPHILS # BLD AUTO: 0.03 K/UL — SIGNIFICANT CHANGE UP (ref 0–0.2)
BASOPHILS NFR BLD AUTO: 0.5 % — SIGNIFICANT CHANGE UP (ref 0–2)
EOSINOPHIL # BLD AUTO: 0.24 K/UL — SIGNIFICANT CHANGE UP (ref 0–0.5)
EOSINOPHIL NFR BLD AUTO: 4.2 % — SIGNIFICANT CHANGE UP (ref 0–6)
HCT VFR BLD CALC: 41.5 % — SIGNIFICANT CHANGE UP (ref 34.5–45)
HGB BLD-MCNC: 13.7 G/DL — SIGNIFICANT CHANGE UP (ref 11.5–15.5)
IMM GRANULOCYTES NFR BLD AUTO: 0.3 % — SIGNIFICANT CHANGE UP (ref 0–0.9)
LYMPHOCYTES # BLD AUTO: 1.71 K/UL — SIGNIFICANT CHANGE UP (ref 1–3.3)
LYMPHOCYTES # BLD AUTO: 29.8 % — SIGNIFICANT CHANGE UP (ref 13–44)
MCHC RBC-ENTMCNC: 30.9 PG — SIGNIFICANT CHANGE UP (ref 27–34)
MCHC RBC-ENTMCNC: 33 G/DL — SIGNIFICANT CHANGE UP (ref 32–36)
MCV RBC AUTO: 93.5 FL — SIGNIFICANT CHANGE UP (ref 80–100)
MONOCYTES # BLD AUTO: 0.47 K/UL — SIGNIFICANT CHANGE UP (ref 0–0.9)
MONOCYTES NFR BLD AUTO: 8.2 % — SIGNIFICANT CHANGE UP (ref 2–14)
NEUTROPHILS # BLD AUTO: 3.26 K/UL — SIGNIFICANT CHANGE UP (ref 1.8–7.4)
NEUTROPHILS NFR BLD AUTO: 57 % — SIGNIFICANT CHANGE UP (ref 43–77)
NRBC # BLD: 0 /100 WBCS — SIGNIFICANT CHANGE UP (ref 0–0)
PLATELET # BLD AUTO: 272 K/UL — SIGNIFICANT CHANGE UP (ref 150–400)
RBC # BLD: 4.44 M/UL — SIGNIFICANT CHANGE UP (ref 3.8–5.2)
RBC # FLD: 12.8 % — SIGNIFICANT CHANGE UP (ref 10.3–14.5)
WBC # BLD: 5.73 K/UL — SIGNIFICANT CHANGE UP (ref 3.8–10.5)
WBC # FLD AUTO: 5.73 K/UL — SIGNIFICANT CHANGE UP (ref 3.8–10.5)

## 2023-09-28 LAB
ALBUMIN SERPL ELPH-MCNC: 4.4 G/DL
ALP BLD-CCNC: 83 U/L
ALT SERPL-CCNC: 25 U/L
ANION GAP SERPL CALC-SCNC: 9 MMOL/L
AST SERPL-CCNC: 33 U/L
BILIRUB SERPL-MCNC: 0.4 MG/DL
BUN SERPL-MCNC: 10 MG/DL
CALCIUM SERPL-MCNC: 9.7 MG/DL
CHLORIDE SERPL-SCNC: 102 MMOL/L
CO2 SERPL-SCNC: 29 MMOL/L
CREAT SERPL-MCNC: 0.65 MG/DL
EGFR: 90 ML/MIN/1.73M2
GLUCOSE SERPL-MCNC: 120 MG/DL
POTASSIUM SERPL-SCNC: 4.8 MMOL/L
PROT SERPL-MCNC: 7.5 G/DL
SODIUM SERPL-SCNC: 140 MMOL/L

## 2023-10-04 ENCOUNTER — APPOINTMENT (OUTPATIENT)
Dept: INFUSION THERAPY | Facility: HOSPITAL | Age: 78
End: 2023-10-04

## 2023-10-11 ENCOUNTER — NON-APPOINTMENT (OUTPATIENT)
Age: 78
End: 2023-10-11

## 2023-10-12 ENCOUNTER — RESULT REVIEW (OUTPATIENT)
Age: 78
End: 2023-10-12

## 2023-10-12 ENCOUNTER — APPOINTMENT (OUTPATIENT)
Dept: INFUSION THERAPY | Facility: HOSPITAL | Age: 78
End: 2023-10-12

## 2023-10-12 ENCOUNTER — APPOINTMENT (OUTPATIENT)
Dept: HEMATOLOGY ONCOLOGY | Facility: CLINIC | Age: 78
End: 2023-10-12
Payer: COMMERCIAL

## 2023-10-12 DIAGNOSIS — F32.89 OTHER SPECIFIED DEPRESSIVE EPISODES: ICD-10-CM

## 2023-10-12 LAB
ALBUMIN SERPL ELPH-MCNC: 4.3 G/DL — SIGNIFICANT CHANGE UP (ref 3.3–5)
ALP SERPL-CCNC: 64 U/L — SIGNIFICANT CHANGE UP (ref 40–120)
ALT FLD-CCNC: 24 U/L — SIGNIFICANT CHANGE UP (ref 10–45)
ANION GAP SERPL CALC-SCNC: 8 MMOL/L — SIGNIFICANT CHANGE UP (ref 5–17)
AST SERPL-CCNC: 36 U/L — SIGNIFICANT CHANGE UP (ref 10–40)
BASOPHILS # BLD AUTO: 0.03 K/UL — SIGNIFICANT CHANGE UP (ref 0–0.2)
BASOPHILS NFR BLD AUTO: 0.5 % — SIGNIFICANT CHANGE UP (ref 0–2)
BILIRUB SERPL-MCNC: 0.4 MG/DL — SIGNIFICANT CHANGE UP (ref 0.2–1.2)
BUN SERPL-MCNC: 13 MG/DL — SIGNIFICANT CHANGE UP (ref 7–23)
CALCIUM SERPL-MCNC: 9.1 MG/DL — SIGNIFICANT CHANGE UP (ref 8.4–10.5)
CHLORIDE SERPL-SCNC: 101 MMOL/L — SIGNIFICANT CHANGE UP (ref 96–108)
CO2 SERPL-SCNC: 27 MMOL/L — SIGNIFICANT CHANGE UP (ref 22–31)
CREAT SERPL-MCNC: 0.59 MG/DL — SIGNIFICANT CHANGE UP (ref 0.5–1.3)
EGFR: 92 ML/MIN/1.73M2 — SIGNIFICANT CHANGE UP
EOSINOPHIL # BLD AUTO: 0.63 K/UL — HIGH (ref 0–0.5)
EOSINOPHIL NFR BLD AUTO: 11 % — HIGH (ref 0–6)
GLUCOSE SERPL-MCNC: 90 MG/DL — SIGNIFICANT CHANGE UP (ref 70–99)
HCT VFR BLD CALC: 39.7 % — SIGNIFICANT CHANGE UP (ref 34.5–45)
HGB BLD-MCNC: 13.3 G/DL — SIGNIFICANT CHANGE UP (ref 11.5–15.5)
IMM GRANULOCYTES NFR BLD AUTO: 0.3 % — SIGNIFICANT CHANGE UP (ref 0–0.9)
LDH SERPL L TO P-CCNC: 135 U/L — SIGNIFICANT CHANGE UP (ref 50–242)
LYMPHOCYTES # BLD AUTO: 1.74 K/UL — SIGNIFICANT CHANGE UP (ref 1–3.3)
LYMPHOCYTES # BLD AUTO: 30.4 % — SIGNIFICANT CHANGE UP (ref 13–44)
MCHC RBC-ENTMCNC: 30.9 PG — SIGNIFICANT CHANGE UP (ref 27–34)
MCHC RBC-ENTMCNC: 33.5 G/DL — SIGNIFICANT CHANGE UP (ref 32–36)
MCV RBC AUTO: 92.3 FL — SIGNIFICANT CHANGE UP (ref 80–100)
MONOCYTES # BLD AUTO: 0.69 K/UL — SIGNIFICANT CHANGE UP (ref 0–0.9)
MONOCYTES NFR BLD AUTO: 12.1 % — SIGNIFICANT CHANGE UP (ref 2–14)
NEUTROPHILS # BLD AUTO: 2.61 K/UL — SIGNIFICANT CHANGE UP (ref 1.8–7.4)
NEUTROPHILS NFR BLD AUTO: 45.7 % — SIGNIFICANT CHANGE UP (ref 43–77)
NRBC # BLD: 0 /100 WBCS — SIGNIFICANT CHANGE UP (ref 0–0)
PLATELET # BLD AUTO: 257 K/UL — SIGNIFICANT CHANGE UP (ref 150–400)
POTASSIUM SERPL-MCNC: 3.8 MMOL/L — SIGNIFICANT CHANGE UP (ref 3.5–5.3)
POTASSIUM SERPL-SCNC: 3.8 MMOL/L — SIGNIFICANT CHANGE UP (ref 3.5–5.3)
PROT SERPL-MCNC: 7.2 G/DL — SIGNIFICANT CHANGE UP (ref 6–8.3)
RBC # BLD: 4.3 M/UL — SIGNIFICANT CHANGE UP (ref 3.8–5.2)
RBC # FLD: 12.7 % — SIGNIFICANT CHANGE UP (ref 10.3–14.5)
SODIUM SERPL-SCNC: 136 MMOL/L — SIGNIFICANT CHANGE UP (ref 135–145)
WBC # BLD: 5.72 K/UL — SIGNIFICANT CHANGE UP (ref 3.8–10.5)
WBC # FLD AUTO: 5.72 K/UL — SIGNIFICANT CHANGE UP (ref 3.8–10.5)

## 2023-10-12 PROCEDURE — 99214 OFFICE O/P EST MOD 30 MIN: CPT

## 2023-10-12 RX ORDER — FENTANYL 37.5 UG/H
37.5 PATCH, EXTENDED RELEASE TRANSDERMAL
Qty: 10 | Refills: 0 | Status: DISCONTINUED | COMMUNITY
Start: 2023-08-31 | End: 2023-10-12

## 2023-10-12 RX ORDER — HYDROMORPHONE HYDROCHLORIDE 2 MG/1
2 TABLET ORAL
Qty: 120 | Refills: 0 | Status: DISCONTINUED | COMMUNITY
Start: 2023-08-21 | End: 2023-10-12

## 2023-10-12 RX ORDER — FENTANYL 12 UG/H
12 PATCH, EXTENDED RELEASE TRANSDERMAL
Qty: 10 | Refills: 0 | Status: ACTIVE | COMMUNITY
Start: 2023-10-12 | End: 1900-01-01

## 2023-10-12 RX ORDER — CEPHALEXIN 250 MG/1
250 TABLET ORAL EVERY 6 HOURS
Qty: 28 | Refills: 0 | Status: DISCONTINUED | COMMUNITY
Start: 2023-08-17 | End: 2023-10-12

## 2023-10-12 RX ORDER — ESCITALOPRAM OXALATE 5 MG/1
5 TABLET ORAL DAILY
Qty: 30 | Refills: 0 | Status: ACTIVE | COMMUNITY
Start: 2023-10-12 | End: 1900-01-01

## 2023-10-13 DIAGNOSIS — Z51.11 ENCOUNTER FOR ANTINEOPLASTIC CHEMOTHERAPY: ICD-10-CM

## 2023-10-17 ENCOUNTER — OUTPATIENT (OUTPATIENT)
Dept: OUTPATIENT SERVICES | Facility: HOSPITAL | Age: 78
LOS: 1 days | Discharge: HOME | End: 2023-10-17

## 2023-10-17 DIAGNOSIS — C43.31 MALIGNANT MELANOMA OF NOSE: ICD-10-CM

## 2023-10-19 ENCOUNTER — APPOINTMENT (OUTPATIENT)
Dept: HEMATOLOGY ONCOLOGY | Facility: CLINIC | Age: 78
End: 2023-10-19

## 2023-10-25 ENCOUNTER — RESULT REVIEW (OUTPATIENT)
Age: 78
End: 2023-10-25

## 2023-10-25 ENCOUNTER — APPOINTMENT (OUTPATIENT)
Dept: HEMATOLOGY ONCOLOGY | Facility: CLINIC | Age: 78
End: 2023-10-25

## 2023-10-25 DIAGNOSIS — N89.8 OTHER SPECIFIED NONINFLAMMATORY DISORDERS OF VAGINA: ICD-10-CM

## 2023-10-25 LAB
ALBUMIN SERPL ELPH-MCNC: 4.2 G/DL
ALP BLD-CCNC: 80 U/L
ALT SERPL-CCNC: 20 U/L
ANION GAP SERPL CALC-SCNC: 10 MMOL/L
AST SERPL-CCNC: 24 U/L
BASOPHILS # BLD AUTO: 0.04 K/UL — SIGNIFICANT CHANGE UP (ref 0–0.2)
BASOPHILS # BLD AUTO: 0.04 K/UL — SIGNIFICANT CHANGE UP (ref 0–0.2)
BASOPHILS NFR BLD AUTO: 0.7 % — SIGNIFICANT CHANGE UP (ref 0–2)
BASOPHILS NFR BLD AUTO: 0.7 % — SIGNIFICANT CHANGE UP (ref 0–2)
BILIRUB SERPL-MCNC: 0.2 MG/DL
BUN SERPL-MCNC: 16 MG/DL
CALCIUM SERPL-MCNC: 9.5 MG/DL
CHLORIDE SERPL-SCNC: 101 MMOL/L
CO2 SERPL-SCNC: 28 MMOL/L
CREAT SERPL-MCNC: 0.73 MG/DL
EGFR: 84 ML/MIN/1.73M2
EOSINOPHIL # BLD AUTO: 0.71 K/UL — HIGH (ref 0–0.5)
EOSINOPHIL # BLD AUTO: 0.71 K/UL — HIGH (ref 0–0.5)
EOSINOPHIL NFR BLD AUTO: 11.9 % — HIGH (ref 0–6)
EOSINOPHIL NFR BLD AUTO: 11.9 % — HIGH (ref 0–6)
GLUCOSE SERPL-MCNC: 103 MG/DL
HCT VFR BLD CALC: 40.6 % — SIGNIFICANT CHANGE UP (ref 34.5–45)
HCT VFR BLD CALC: 40.6 % — SIGNIFICANT CHANGE UP (ref 34.5–45)
HGB BLD-MCNC: 13.7 G/DL — SIGNIFICANT CHANGE UP (ref 11.5–15.5)
HGB BLD-MCNC: 13.7 G/DL — SIGNIFICANT CHANGE UP (ref 11.5–15.5)
IMM GRANULOCYTES NFR BLD AUTO: 0.5 % — SIGNIFICANT CHANGE UP (ref 0–0.9)
IMM GRANULOCYTES NFR BLD AUTO: 0.5 % — SIGNIFICANT CHANGE UP (ref 0–0.9)
LDH SERPL-CCNC: 148 U/L
LYMPHOCYTES # BLD AUTO: 1.82 K/UL — SIGNIFICANT CHANGE UP (ref 1–3.3)
LYMPHOCYTES # BLD AUTO: 1.82 K/UL — SIGNIFICANT CHANGE UP (ref 1–3.3)
LYMPHOCYTES # BLD AUTO: 30.4 % — SIGNIFICANT CHANGE UP (ref 13–44)
LYMPHOCYTES # BLD AUTO: 30.4 % — SIGNIFICANT CHANGE UP (ref 13–44)
MCHC RBC-ENTMCNC: 31.3 PG — SIGNIFICANT CHANGE UP (ref 27–34)
MCHC RBC-ENTMCNC: 31.3 PG — SIGNIFICANT CHANGE UP (ref 27–34)
MCHC RBC-ENTMCNC: 33.7 G/DL — SIGNIFICANT CHANGE UP (ref 32–36)
MCHC RBC-ENTMCNC: 33.7 G/DL — SIGNIFICANT CHANGE UP (ref 32–36)
MCV RBC AUTO: 92.7 FL — SIGNIFICANT CHANGE UP (ref 80–100)
MCV RBC AUTO: 92.7 FL — SIGNIFICANT CHANGE UP (ref 80–100)
MONOCYTES # BLD AUTO: 0.48 K/UL — SIGNIFICANT CHANGE UP (ref 0–0.9)
MONOCYTES # BLD AUTO: 0.48 K/UL — SIGNIFICANT CHANGE UP (ref 0–0.9)
MONOCYTES NFR BLD AUTO: 8 % — SIGNIFICANT CHANGE UP (ref 2–14)
MONOCYTES NFR BLD AUTO: 8 % — SIGNIFICANT CHANGE UP (ref 2–14)
NEUTROPHILS # BLD AUTO: 2.9 K/UL — SIGNIFICANT CHANGE UP (ref 1.8–7.4)
NEUTROPHILS # BLD AUTO: 2.9 K/UL — SIGNIFICANT CHANGE UP (ref 1.8–7.4)
NEUTROPHILS NFR BLD AUTO: 48.5 % — SIGNIFICANT CHANGE UP (ref 43–77)
NEUTROPHILS NFR BLD AUTO: 48.5 % — SIGNIFICANT CHANGE UP (ref 43–77)
NRBC # BLD: 0 /100 WBCS — SIGNIFICANT CHANGE UP (ref 0–0)
NRBC # BLD: 0 /100 WBCS — SIGNIFICANT CHANGE UP (ref 0–0)
PLATELET # BLD AUTO: 287 K/UL — SIGNIFICANT CHANGE UP (ref 150–400)
PLATELET # BLD AUTO: 287 K/UL — SIGNIFICANT CHANGE UP (ref 150–400)
POTASSIUM SERPL-SCNC: 4.2 MMOL/L
PROT SERPL-MCNC: 7.2 G/DL
RBC # BLD: 4.38 M/UL — SIGNIFICANT CHANGE UP (ref 3.8–5.2)
RBC # BLD: 4.38 M/UL — SIGNIFICANT CHANGE UP (ref 3.8–5.2)
RBC # FLD: 12.3 % — SIGNIFICANT CHANGE UP (ref 10.3–14.5)
RBC # FLD: 12.3 % — SIGNIFICANT CHANGE UP (ref 10.3–14.5)
SODIUM SERPL-SCNC: 138 MMOL/L
WBC # BLD: 5.98 K/UL — SIGNIFICANT CHANGE UP (ref 3.8–10.5)
WBC # BLD: 5.98 K/UL — SIGNIFICANT CHANGE UP (ref 3.8–10.5)
WBC # FLD AUTO: 5.98 K/UL — SIGNIFICANT CHANGE UP (ref 3.8–10.5)
WBC # FLD AUTO: 5.98 K/UL — SIGNIFICANT CHANGE UP (ref 3.8–10.5)

## 2023-10-26 ENCOUNTER — APPOINTMENT (OUTPATIENT)
Dept: HEMATOLOGY ONCOLOGY | Facility: CLINIC | Age: 78
End: 2023-10-26

## 2023-10-31 ENCOUNTER — RESULT REVIEW (OUTPATIENT)
Age: 78
End: 2023-10-31

## 2023-11-02 ENCOUNTER — APPOINTMENT (OUTPATIENT)
Dept: HEMATOLOGY ONCOLOGY | Facility: CLINIC | Age: 78
End: 2023-11-02
Payer: COMMERCIAL

## 2023-11-02 VITALS
HEIGHT: 57.99 IN | DIASTOLIC BLOOD PRESSURE: 84 MMHG | BODY MASS INDEX: 32.12 KG/M2 | OXYGEN SATURATION: 94 % | SYSTOLIC BLOOD PRESSURE: 136 MMHG | HEART RATE: 74 BPM | TEMPERATURE: 97.2 F | WEIGHT: 153 LBS | RESPIRATION RATE: 16 BRPM

## 2023-11-02 PROCEDURE — 99215 OFFICE O/P EST HI 40 MIN: CPT

## 2023-11-03 PROBLEM — Z00.00 ENCOUNTER FOR PREVENTIVE HEALTH EXAMINATION: Status: ACTIVE | Noted: 2023-11-03

## 2023-11-09 ENCOUNTER — NON-APPOINTMENT (OUTPATIENT)
Age: 78
End: 2023-11-09

## 2023-11-09 ENCOUNTER — APPOINTMENT (OUTPATIENT)
Dept: INFUSION THERAPY | Facility: HOSPITAL | Age: 78
End: 2023-11-09

## 2023-11-09 ENCOUNTER — RESULT REVIEW (OUTPATIENT)
Age: 78
End: 2023-11-09

## 2023-11-09 LAB
ALBUMIN SERPL ELPH-MCNC: 4.2 G/DL — SIGNIFICANT CHANGE UP (ref 3.3–5)
ALBUMIN SERPL ELPH-MCNC: 4.2 G/DL — SIGNIFICANT CHANGE UP (ref 3.3–5)
ALP SERPL-CCNC: 67 U/L — SIGNIFICANT CHANGE UP (ref 40–120)
ALP SERPL-CCNC: 67 U/L — SIGNIFICANT CHANGE UP (ref 40–120)
ALT FLD-CCNC: 18 U/L — SIGNIFICANT CHANGE UP (ref 10–45)
ALT FLD-CCNC: 18 U/L — SIGNIFICANT CHANGE UP (ref 10–45)
ANION GAP SERPL CALC-SCNC: 9 MMOL/L — SIGNIFICANT CHANGE UP (ref 5–17)
ANION GAP SERPL CALC-SCNC: 9 MMOL/L — SIGNIFICANT CHANGE UP (ref 5–17)
AST SERPL-CCNC: 26 U/L — SIGNIFICANT CHANGE UP (ref 10–40)
AST SERPL-CCNC: 26 U/L — SIGNIFICANT CHANGE UP (ref 10–40)
BASOPHILS # BLD AUTO: 0.05 K/UL — SIGNIFICANT CHANGE UP (ref 0–0.2)
BASOPHILS # BLD AUTO: 0.05 K/UL — SIGNIFICANT CHANGE UP (ref 0–0.2)
BASOPHILS NFR BLD AUTO: 0.8 % — SIGNIFICANT CHANGE UP (ref 0–2)
BASOPHILS NFR BLD AUTO: 0.8 % — SIGNIFICANT CHANGE UP (ref 0–2)
BILIRUB SERPL-MCNC: 0.3 MG/DL — SIGNIFICANT CHANGE UP (ref 0.2–1.2)
BILIRUB SERPL-MCNC: 0.3 MG/DL — SIGNIFICANT CHANGE UP (ref 0.2–1.2)
BUN SERPL-MCNC: 14 MG/DL — SIGNIFICANT CHANGE UP (ref 7–23)
BUN SERPL-MCNC: 14 MG/DL — SIGNIFICANT CHANGE UP (ref 7–23)
CALCIUM SERPL-MCNC: 9.3 MG/DL — SIGNIFICANT CHANGE UP (ref 8.4–10.5)
CALCIUM SERPL-MCNC: 9.3 MG/DL — SIGNIFICANT CHANGE UP (ref 8.4–10.5)
CHLORIDE SERPL-SCNC: 101 MMOL/L — SIGNIFICANT CHANGE UP (ref 96–108)
CHLORIDE SERPL-SCNC: 101 MMOL/L — SIGNIFICANT CHANGE UP (ref 96–108)
CO2 SERPL-SCNC: 29 MMOL/L — SIGNIFICANT CHANGE UP (ref 22–31)
CO2 SERPL-SCNC: 29 MMOL/L — SIGNIFICANT CHANGE UP (ref 22–31)
CREAT SERPL-MCNC: 0.53 MG/DL — SIGNIFICANT CHANGE UP (ref 0.5–1.3)
CREAT SERPL-MCNC: 0.53 MG/DL — SIGNIFICANT CHANGE UP (ref 0.5–1.3)
EGFR: 94 ML/MIN/1.73M2 — SIGNIFICANT CHANGE UP
EGFR: 94 ML/MIN/1.73M2 — SIGNIFICANT CHANGE UP
EOSINOPHIL # BLD AUTO: 0.47 K/UL — SIGNIFICANT CHANGE UP (ref 0–0.5)
EOSINOPHIL # BLD AUTO: 0.47 K/UL — SIGNIFICANT CHANGE UP (ref 0–0.5)
EOSINOPHIL NFR BLD AUTO: 7.4 % — HIGH (ref 0–6)
EOSINOPHIL NFR BLD AUTO: 7.4 % — HIGH (ref 0–6)
GLUCOSE SERPL-MCNC: 101 MG/DL — HIGH (ref 70–99)
GLUCOSE SERPL-MCNC: 101 MG/DL — HIGH (ref 70–99)
HCT VFR BLD CALC: 40.6 % — SIGNIFICANT CHANGE UP (ref 34.5–45)
HCT VFR BLD CALC: 40.6 % — SIGNIFICANT CHANGE UP (ref 34.5–45)
HGB BLD-MCNC: 13.7 G/DL — SIGNIFICANT CHANGE UP (ref 11.5–15.5)
HGB BLD-MCNC: 13.7 G/DL — SIGNIFICANT CHANGE UP (ref 11.5–15.5)
IMM GRANULOCYTES NFR BLD AUTO: 0.9 % — SIGNIFICANT CHANGE UP (ref 0–0.9)
IMM GRANULOCYTES NFR BLD AUTO: 0.9 % — SIGNIFICANT CHANGE UP (ref 0–0.9)
LDH SERPL L TO P-CCNC: 157 U/L — SIGNIFICANT CHANGE UP (ref 50–242)
LDH SERPL L TO P-CCNC: 157 U/L — SIGNIFICANT CHANGE UP (ref 50–242)
LYMPHOCYTES # BLD AUTO: 1.66 K/UL — SIGNIFICANT CHANGE UP (ref 1–3.3)
LYMPHOCYTES # BLD AUTO: 1.66 K/UL — SIGNIFICANT CHANGE UP (ref 1–3.3)
LYMPHOCYTES # BLD AUTO: 26.2 % — SIGNIFICANT CHANGE UP (ref 13–44)
LYMPHOCYTES # BLD AUTO: 26.2 % — SIGNIFICANT CHANGE UP (ref 13–44)
MCHC RBC-ENTMCNC: 31.1 PG — SIGNIFICANT CHANGE UP (ref 27–34)
MCHC RBC-ENTMCNC: 31.1 PG — SIGNIFICANT CHANGE UP (ref 27–34)
MCHC RBC-ENTMCNC: 33.7 G/DL — SIGNIFICANT CHANGE UP (ref 32–36)
MCHC RBC-ENTMCNC: 33.7 G/DL — SIGNIFICANT CHANGE UP (ref 32–36)
MCV RBC AUTO: 92.1 FL — SIGNIFICANT CHANGE UP (ref 80–100)
MCV RBC AUTO: 92.1 FL — SIGNIFICANT CHANGE UP (ref 80–100)
MONOCYTES # BLD AUTO: 0.58 K/UL — SIGNIFICANT CHANGE UP (ref 0–0.9)
MONOCYTES # BLD AUTO: 0.58 K/UL — SIGNIFICANT CHANGE UP (ref 0–0.9)
MONOCYTES NFR BLD AUTO: 9.1 % — SIGNIFICANT CHANGE UP (ref 2–14)
MONOCYTES NFR BLD AUTO: 9.1 % — SIGNIFICANT CHANGE UP (ref 2–14)
NEUTROPHILS # BLD AUTO: 3.52 K/UL — SIGNIFICANT CHANGE UP (ref 1.8–7.4)
NEUTROPHILS # BLD AUTO: 3.52 K/UL — SIGNIFICANT CHANGE UP (ref 1.8–7.4)
NEUTROPHILS NFR BLD AUTO: 55.6 % — SIGNIFICANT CHANGE UP (ref 43–77)
NEUTROPHILS NFR BLD AUTO: 55.6 % — SIGNIFICANT CHANGE UP (ref 43–77)
NRBC # BLD: 0 /100 WBCS — SIGNIFICANT CHANGE UP (ref 0–0)
NRBC # BLD: 0 /100 WBCS — SIGNIFICANT CHANGE UP (ref 0–0)
PLATELET # BLD AUTO: 305 K/UL — SIGNIFICANT CHANGE UP (ref 150–400)
PLATELET # BLD AUTO: 305 K/UL — SIGNIFICANT CHANGE UP (ref 150–400)
POTASSIUM SERPL-MCNC: 4 MMOL/L — SIGNIFICANT CHANGE UP (ref 3.5–5.3)
POTASSIUM SERPL-MCNC: 4 MMOL/L — SIGNIFICANT CHANGE UP (ref 3.5–5.3)
POTASSIUM SERPL-SCNC: 4 MMOL/L — SIGNIFICANT CHANGE UP (ref 3.5–5.3)
POTASSIUM SERPL-SCNC: 4 MMOL/L — SIGNIFICANT CHANGE UP (ref 3.5–5.3)
PROT SERPL-MCNC: 7.5 G/DL — SIGNIFICANT CHANGE UP (ref 6–8.3)
PROT SERPL-MCNC: 7.5 G/DL — SIGNIFICANT CHANGE UP (ref 6–8.3)
RBC # BLD: 4.41 M/UL — SIGNIFICANT CHANGE UP (ref 3.8–5.2)
RBC # BLD: 4.41 M/UL — SIGNIFICANT CHANGE UP (ref 3.8–5.2)
RBC # FLD: 12.6 % — SIGNIFICANT CHANGE UP (ref 10.3–14.5)
RBC # FLD: 12.6 % — SIGNIFICANT CHANGE UP (ref 10.3–14.5)
SODIUM SERPL-SCNC: 138 MMOL/L — SIGNIFICANT CHANGE UP (ref 135–145)
SODIUM SERPL-SCNC: 138 MMOL/L — SIGNIFICANT CHANGE UP (ref 135–145)
TSH SERPL-MCNC: 5.1 UIU/ML — HIGH (ref 0.27–4.2)
TSH SERPL-MCNC: 5.1 UIU/ML — HIGH (ref 0.27–4.2)
WBC # BLD: 6.34 K/UL — SIGNIFICANT CHANGE UP (ref 3.8–10.5)
WBC # BLD: 6.34 K/UL — SIGNIFICANT CHANGE UP (ref 3.8–10.5)
WBC # FLD AUTO: 6.34 K/UL — SIGNIFICANT CHANGE UP (ref 3.8–10.5)
WBC # FLD AUTO: 6.34 K/UL — SIGNIFICANT CHANGE UP (ref 3.8–10.5)

## 2023-11-15 DIAGNOSIS — Z51.11 ENCOUNTER FOR ANTINEOPLASTIC CHEMOTHERAPY: ICD-10-CM

## 2023-11-24 ENCOUNTER — OUTPATIENT (OUTPATIENT)
Dept: OUTPATIENT SERVICES | Facility: HOSPITAL | Age: 78
LOS: 1 days | End: 2023-11-24
Payer: COMMERCIAL

## 2023-11-24 ENCOUNTER — APPOINTMENT (OUTPATIENT)
Dept: MRI IMAGING | Facility: IMAGING CENTER | Age: 78
End: 2023-11-24
Payer: COMMERCIAL

## 2023-11-24 DIAGNOSIS — Z00.8 ENCOUNTER FOR OTHER GENERAL EXAMINATION: ICD-10-CM

## 2023-11-24 PROCEDURE — 74183 MRI ABD W/O CNTR FLWD CNTR: CPT

## 2023-11-24 PROCEDURE — 74183 MRI ABD W/O CNTR FLWD CNTR: CPT | Mod: 26

## 2023-11-24 PROCEDURE — A9585: CPT

## 2023-11-24 PROCEDURE — 72197 MRI PELVIS W/O & W/DYE: CPT | Mod: 26

## 2023-11-24 PROCEDURE — 72197 MRI PELVIS W/O & W/DYE: CPT

## 2023-12-04 ENCOUNTER — RESULT REVIEW (OUTPATIENT)
Age: 78
End: 2023-12-04

## 2023-12-04 ENCOUNTER — APPOINTMENT (OUTPATIENT)
Dept: INFUSION THERAPY | Facility: HOSPITAL | Age: 78
End: 2023-12-04

## 2023-12-04 LAB
BASOPHILS # BLD AUTO: 0.04 K/UL — SIGNIFICANT CHANGE UP (ref 0–0.2)
BASOPHILS # BLD AUTO: 0.04 K/UL — SIGNIFICANT CHANGE UP (ref 0–0.2)
BASOPHILS NFR BLD AUTO: 0.5 % — SIGNIFICANT CHANGE UP (ref 0–2)
BASOPHILS NFR BLD AUTO: 0.5 % — SIGNIFICANT CHANGE UP (ref 0–2)
EOSINOPHIL # BLD AUTO: 0.66 K/UL — HIGH (ref 0–0.5)
EOSINOPHIL # BLD AUTO: 0.66 K/UL — HIGH (ref 0–0.5)
EOSINOPHIL NFR BLD AUTO: 8.2 % — HIGH (ref 0–6)
EOSINOPHIL NFR BLD AUTO: 8.2 % — HIGH (ref 0–6)
HCT VFR BLD CALC: 38.4 % — SIGNIFICANT CHANGE UP (ref 34.5–45)
HCT VFR BLD CALC: 38.4 % — SIGNIFICANT CHANGE UP (ref 34.5–45)
HGB BLD-MCNC: 13.2 G/DL — SIGNIFICANT CHANGE UP (ref 11.5–15.5)
HGB BLD-MCNC: 13.2 G/DL — SIGNIFICANT CHANGE UP (ref 11.5–15.5)
IMM GRANULOCYTES NFR BLD AUTO: 0.5 % — SIGNIFICANT CHANGE UP (ref 0–0.9)
IMM GRANULOCYTES NFR BLD AUTO: 0.5 % — SIGNIFICANT CHANGE UP (ref 0–0.9)
LYMPHOCYTES # BLD AUTO: 1.91 K/UL — SIGNIFICANT CHANGE UP (ref 1–3.3)
LYMPHOCYTES # BLD AUTO: 1.91 K/UL — SIGNIFICANT CHANGE UP (ref 1–3.3)
LYMPHOCYTES # BLD AUTO: 23.7 % — SIGNIFICANT CHANGE UP (ref 13–44)
LYMPHOCYTES # BLD AUTO: 23.7 % — SIGNIFICANT CHANGE UP (ref 13–44)
MCHC RBC-ENTMCNC: 31.1 PG — SIGNIFICANT CHANGE UP (ref 27–34)
MCHC RBC-ENTMCNC: 31.1 PG — SIGNIFICANT CHANGE UP (ref 27–34)
MCHC RBC-ENTMCNC: 34.4 G/DL — SIGNIFICANT CHANGE UP (ref 32–36)
MCHC RBC-ENTMCNC: 34.4 G/DL — SIGNIFICANT CHANGE UP (ref 32–36)
MCV RBC AUTO: 90.6 FL — SIGNIFICANT CHANGE UP (ref 80–100)
MCV RBC AUTO: 90.6 FL — SIGNIFICANT CHANGE UP (ref 80–100)
MONOCYTES # BLD AUTO: 0.7 K/UL — SIGNIFICANT CHANGE UP (ref 0–0.9)
MONOCYTES # BLD AUTO: 0.7 K/UL — SIGNIFICANT CHANGE UP (ref 0–0.9)
MONOCYTES NFR BLD AUTO: 8.7 % — SIGNIFICANT CHANGE UP (ref 2–14)
MONOCYTES NFR BLD AUTO: 8.7 % — SIGNIFICANT CHANGE UP (ref 2–14)
NEUTROPHILS # BLD AUTO: 4.71 K/UL — SIGNIFICANT CHANGE UP (ref 1.8–7.4)
NEUTROPHILS # BLD AUTO: 4.71 K/UL — SIGNIFICANT CHANGE UP (ref 1.8–7.4)
NEUTROPHILS NFR BLD AUTO: 58.4 % — SIGNIFICANT CHANGE UP (ref 43–77)
NEUTROPHILS NFR BLD AUTO: 58.4 % — SIGNIFICANT CHANGE UP (ref 43–77)
NRBC # BLD: 0 /100 WBCS — SIGNIFICANT CHANGE UP (ref 0–0)
NRBC # BLD: 0 /100 WBCS — SIGNIFICANT CHANGE UP (ref 0–0)
PLATELET # BLD AUTO: 284 K/UL — SIGNIFICANT CHANGE UP (ref 150–400)
PLATELET # BLD AUTO: 284 K/UL — SIGNIFICANT CHANGE UP (ref 150–400)
RBC # BLD: 4.24 M/UL — SIGNIFICANT CHANGE UP (ref 3.8–5.2)
RBC # BLD: 4.24 M/UL — SIGNIFICANT CHANGE UP (ref 3.8–5.2)
RBC # FLD: 13 % — SIGNIFICANT CHANGE UP (ref 10.3–14.5)
RBC # FLD: 13 % — SIGNIFICANT CHANGE UP (ref 10.3–14.5)
T4 FREE+ TSH PNL SERPL: 2.36 UIU/ML — SIGNIFICANT CHANGE UP (ref 0.27–4.2)
T4 FREE+ TSH PNL SERPL: 2.36 UIU/ML — SIGNIFICANT CHANGE UP (ref 0.27–4.2)
WBC # BLD: 8.06 K/UL — SIGNIFICANT CHANGE UP (ref 3.8–10.5)
WBC # BLD: 8.06 K/UL — SIGNIFICANT CHANGE UP (ref 3.8–10.5)
WBC # FLD AUTO: 8.06 K/UL — SIGNIFICANT CHANGE UP (ref 3.8–10.5)
WBC # FLD AUTO: 8.06 K/UL — SIGNIFICANT CHANGE UP (ref 3.8–10.5)

## 2023-12-05 LAB
ALBUMIN SERPL ELPH-MCNC: 4.1 G/DL — SIGNIFICANT CHANGE UP (ref 3.3–5)
ALBUMIN SERPL ELPH-MCNC: 4.1 G/DL — SIGNIFICANT CHANGE UP (ref 3.3–5)
ALP SERPL-CCNC: 85 U/L — SIGNIFICANT CHANGE UP (ref 40–120)
ALP SERPL-CCNC: 85 U/L — SIGNIFICANT CHANGE UP (ref 40–120)
ALT FLD-CCNC: 22 U/L — SIGNIFICANT CHANGE UP (ref 10–45)
ALT FLD-CCNC: 22 U/L — SIGNIFICANT CHANGE UP (ref 10–45)
ANION GAP SERPL CALC-SCNC: 16 MMOL/L — SIGNIFICANT CHANGE UP (ref 5–17)
ANION GAP SERPL CALC-SCNC: 16 MMOL/L — SIGNIFICANT CHANGE UP (ref 5–17)
AST SERPL-CCNC: 38 U/L — SIGNIFICANT CHANGE UP (ref 10–40)
AST SERPL-CCNC: 38 U/L — SIGNIFICANT CHANGE UP (ref 10–40)
BILIRUB SERPL-MCNC: 0.2 MG/DL — SIGNIFICANT CHANGE UP (ref 0.2–1.2)
BILIRUB SERPL-MCNC: 0.2 MG/DL — SIGNIFICANT CHANGE UP (ref 0.2–1.2)
BUN SERPL-MCNC: 16 MG/DL — SIGNIFICANT CHANGE UP (ref 7–23)
BUN SERPL-MCNC: 16 MG/DL — SIGNIFICANT CHANGE UP (ref 7–23)
CALCIUM SERPL-MCNC: 9.9 MG/DL — SIGNIFICANT CHANGE UP (ref 8.4–10.5)
CALCIUM SERPL-MCNC: 9.9 MG/DL — SIGNIFICANT CHANGE UP (ref 8.4–10.5)
CHLORIDE SERPL-SCNC: 97 MMOL/L — SIGNIFICANT CHANGE UP (ref 96–108)
CHLORIDE SERPL-SCNC: 97 MMOL/L — SIGNIFICANT CHANGE UP (ref 96–108)
CO2 SERPL-SCNC: 22 MMOL/L — SIGNIFICANT CHANGE UP (ref 22–31)
CO2 SERPL-SCNC: 22 MMOL/L — SIGNIFICANT CHANGE UP (ref 22–31)
CREAT SERPL-MCNC: 0.56 MG/DL — SIGNIFICANT CHANGE UP (ref 0.5–1.3)
CREAT SERPL-MCNC: 0.56 MG/DL — SIGNIFICANT CHANGE UP (ref 0.5–1.3)
EGFR: 93 ML/MIN/1.73M2 — SIGNIFICANT CHANGE UP
EGFR: 93 ML/MIN/1.73M2 — SIGNIFICANT CHANGE UP
GLUCOSE SERPL-MCNC: 108 MG/DL — HIGH (ref 70–99)
GLUCOSE SERPL-MCNC: 108 MG/DL — HIGH (ref 70–99)
LDH SERPL L TO P-CCNC: 437 U/L — HIGH (ref 50–242)
LDH SERPL L TO P-CCNC: 437 U/L — HIGH (ref 50–242)
POTASSIUM SERPL-MCNC: 4.4 MMOL/L — SIGNIFICANT CHANGE UP (ref 3.5–5.3)
POTASSIUM SERPL-MCNC: 4.4 MMOL/L — SIGNIFICANT CHANGE UP (ref 3.5–5.3)
POTASSIUM SERPL-SCNC: 4.4 MMOL/L — SIGNIFICANT CHANGE UP (ref 3.5–5.3)
POTASSIUM SERPL-SCNC: 4.4 MMOL/L — SIGNIFICANT CHANGE UP (ref 3.5–5.3)
PROT SERPL-MCNC: 7.7 G/DL — SIGNIFICANT CHANGE UP (ref 6–8.3)
PROT SERPL-MCNC: 7.7 G/DL — SIGNIFICANT CHANGE UP (ref 6–8.3)
SODIUM SERPL-SCNC: 136 MMOL/L — SIGNIFICANT CHANGE UP (ref 135–145)
SODIUM SERPL-SCNC: 136 MMOL/L — SIGNIFICANT CHANGE UP (ref 135–145)

## 2023-12-07 ENCOUNTER — RESULT REVIEW (OUTPATIENT)
Age: 78
End: 2023-12-07

## 2023-12-07 ENCOUNTER — APPOINTMENT (OUTPATIENT)
Dept: HEMATOLOGY ONCOLOGY | Facility: CLINIC | Age: 78
End: 2023-12-07
Payer: COMMERCIAL

## 2023-12-07 VITALS
OXYGEN SATURATION: 95 % | WEIGHT: 153.22 LBS | BODY MASS INDEX: 32.16 KG/M2 | RESPIRATION RATE: 16 BRPM | HEIGHT: 57.99 IN | HEART RATE: 62 BPM | TEMPERATURE: 97.7 F | SYSTOLIC BLOOD PRESSURE: 147 MMHG | DIASTOLIC BLOOD PRESSURE: 81 MMHG

## 2023-12-07 LAB
BASOPHILS # BLD AUTO: 0.04 K/UL — SIGNIFICANT CHANGE UP (ref 0–0.2)
BASOPHILS # BLD AUTO: 0.04 K/UL — SIGNIFICANT CHANGE UP (ref 0–0.2)
BASOPHILS NFR BLD AUTO: 0.6 % — SIGNIFICANT CHANGE UP (ref 0–2)
BASOPHILS NFR BLD AUTO: 0.6 % — SIGNIFICANT CHANGE UP (ref 0–2)
EOSINOPHIL # BLD AUTO: 0.67 K/UL — HIGH (ref 0–0.5)
EOSINOPHIL # BLD AUTO: 0.67 K/UL — HIGH (ref 0–0.5)
EOSINOPHIL NFR BLD AUTO: 10.6 % — HIGH (ref 0–6)
EOSINOPHIL NFR BLD AUTO: 10.6 % — HIGH (ref 0–6)
HCT VFR BLD CALC: 40.4 % — SIGNIFICANT CHANGE UP (ref 34.5–45)
HCT VFR BLD CALC: 40.4 % — SIGNIFICANT CHANGE UP (ref 34.5–45)
HGB BLD-MCNC: 13.6 G/DL — SIGNIFICANT CHANGE UP (ref 11.5–15.5)
HGB BLD-MCNC: 13.6 G/DL — SIGNIFICANT CHANGE UP (ref 11.5–15.5)
IMM GRANULOCYTES NFR BLD AUTO: 0.5 % — SIGNIFICANT CHANGE UP (ref 0–0.9)
IMM GRANULOCYTES NFR BLD AUTO: 0.5 % — SIGNIFICANT CHANGE UP (ref 0–0.9)
LYMPHOCYTES # BLD AUTO: 1.56 K/UL — SIGNIFICANT CHANGE UP (ref 1–3.3)
LYMPHOCYTES # BLD AUTO: 1.56 K/UL — SIGNIFICANT CHANGE UP (ref 1–3.3)
LYMPHOCYTES # BLD AUTO: 24.6 % — SIGNIFICANT CHANGE UP (ref 13–44)
LYMPHOCYTES # BLD AUTO: 24.6 % — SIGNIFICANT CHANGE UP (ref 13–44)
MCHC RBC-ENTMCNC: 31.2 PG — SIGNIFICANT CHANGE UP (ref 27–34)
MCHC RBC-ENTMCNC: 31.2 PG — SIGNIFICANT CHANGE UP (ref 27–34)
MCHC RBC-ENTMCNC: 33.7 G/DL — SIGNIFICANT CHANGE UP (ref 32–36)
MCHC RBC-ENTMCNC: 33.7 G/DL — SIGNIFICANT CHANGE UP (ref 32–36)
MCV RBC AUTO: 92.7 FL — SIGNIFICANT CHANGE UP (ref 80–100)
MCV RBC AUTO: 92.7 FL — SIGNIFICANT CHANGE UP (ref 80–100)
MONOCYTES # BLD AUTO: 0.56 K/UL — SIGNIFICANT CHANGE UP (ref 0–0.9)
MONOCYTES # BLD AUTO: 0.56 K/UL — SIGNIFICANT CHANGE UP (ref 0–0.9)
MONOCYTES NFR BLD AUTO: 8.8 % — SIGNIFICANT CHANGE UP (ref 2–14)
MONOCYTES NFR BLD AUTO: 8.8 % — SIGNIFICANT CHANGE UP (ref 2–14)
NEUTROPHILS # BLD AUTO: 3.47 K/UL — SIGNIFICANT CHANGE UP (ref 1.8–7.4)
NEUTROPHILS # BLD AUTO: 3.47 K/UL — SIGNIFICANT CHANGE UP (ref 1.8–7.4)
NEUTROPHILS NFR BLD AUTO: 54.9 % — SIGNIFICANT CHANGE UP (ref 43–77)
NEUTROPHILS NFR BLD AUTO: 54.9 % — SIGNIFICANT CHANGE UP (ref 43–77)
NRBC # BLD: 0 /100 WBCS — SIGNIFICANT CHANGE UP (ref 0–0)
NRBC # BLD: 0 /100 WBCS — SIGNIFICANT CHANGE UP (ref 0–0)
PLATELET # BLD AUTO: 302 K/UL — SIGNIFICANT CHANGE UP (ref 150–400)
PLATELET # BLD AUTO: 302 K/UL — SIGNIFICANT CHANGE UP (ref 150–400)
RBC # BLD: 4.36 M/UL — SIGNIFICANT CHANGE UP (ref 3.8–5.2)
RBC # BLD: 4.36 M/UL — SIGNIFICANT CHANGE UP (ref 3.8–5.2)
RBC # FLD: 13.2 % — SIGNIFICANT CHANGE UP (ref 10.3–14.5)
RBC # FLD: 13.2 % — SIGNIFICANT CHANGE UP (ref 10.3–14.5)
WBC # BLD: 6.33 K/UL — SIGNIFICANT CHANGE UP (ref 3.8–10.5)
WBC # BLD: 6.33 K/UL — SIGNIFICANT CHANGE UP (ref 3.8–10.5)
WBC # FLD AUTO: 6.33 K/UL — SIGNIFICANT CHANGE UP (ref 3.8–10.5)
WBC # FLD AUTO: 6.33 K/UL — SIGNIFICANT CHANGE UP (ref 3.8–10.5)

## 2023-12-07 PROCEDURE — 99215 OFFICE O/P EST HI 40 MIN: CPT

## 2023-12-08 LAB
ALBUMIN SERPL ELPH-MCNC: 4.3 G/DL
ALP BLD-CCNC: 74 U/L
ALT SERPL-CCNC: 24 U/L
ANION GAP SERPL CALC-SCNC: 11 MMOL/L
AST SERPL-CCNC: 28 U/L
BILIRUB SERPL-MCNC: 0.4 MG/DL
BUN SERPL-MCNC: 14 MG/DL
CALCIUM SERPL-MCNC: 9.6 MG/DL
CHLORIDE SERPL-SCNC: 98 MMOL/L
CO2 SERPL-SCNC: 29 MMOL/L
CREAT SERPL-MCNC: 0.63 MG/DL
EGFR: 90 ML/MIN/1.73M2
GLUCOSE SERPL-MCNC: 91 MG/DL
POTASSIUM SERPL-SCNC: 4.6 MMOL/L
PROT SERPL-MCNC: 7.3 G/DL
SODIUM SERPL-SCNC: 139 MMOL/L

## 2024-01-04 ENCOUNTER — OUTPATIENT (OUTPATIENT)
Dept: OUTPATIENT SERVICES | Facility: HOSPITAL | Age: 79
LOS: 1 days | Discharge: HOME | End: 2024-01-04

## 2024-01-04 DIAGNOSIS — C43.31 MALIGNANT MELANOMA OF NOSE: ICD-10-CM

## 2024-01-09 ENCOUNTER — APPOINTMENT (OUTPATIENT)
Dept: INFUSION THERAPY | Facility: HOSPITAL | Age: 79
End: 2024-01-09

## 2024-01-09 ENCOUNTER — INPATIENT (INPATIENT)
Facility: HOSPITAL | Age: 79
LOS: 3 days | Discharge: HOME | DRG: 392 | End: 2024-01-13
Attending: INTERNAL MEDICINE | Admitting: INTERNAL MEDICINE
Payer: MEDICAID

## 2024-01-09 VITALS
DIASTOLIC BLOOD PRESSURE: 76 MMHG | HEIGHT: 60 IN | WEIGHT: 156.97 LBS | RESPIRATION RATE: 19 BRPM | TEMPERATURE: 98 F | SYSTOLIC BLOOD PRESSURE: 190 MMHG | OXYGEN SATURATION: 95 % | HEART RATE: 67 BPM

## 2024-01-09 DIAGNOSIS — R10.9 UNSPECIFIED ABDOMINAL PAIN: ICD-10-CM

## 2024-01-09 LAB
ALBUMIN SERPL ELPH-MCNC: 4.2 G/DL — SIGNIFICANT CHANGE UP (ref 3.3–5)
ALBUMIN SERPL ELPH-MCNC: 4.2 G/DL — SIGNIFICANT CHANGE UP (ref 3.3–5)
ALP SERPL-CCNC: 97 U/L — SIGNIFICANT CHANGE UP (ref 40–120)
ALP SERPL-CCNC: 97 U/L — SIGNIFICANT CHANGE UP (ref 40–120)
ALT FLD-CCNC: 91 U/L — HIGH (ref 10–45)
ALT FLD-CCNC: 91 U/L — HIGH (ref 10–45)
ANION GAP SERPL CALC-SCNC: 10 MMOL/L — SIGNIFICANT CHANGE UP (ref 5–17)
ANION GAP SERPL CALC-SCNC: 10 MMOL/L — SIGNIFICANT CHANGE UP (ref 5–17)
ANION GAP SERPL CALC-SCNC: 12 MMOL/L — SIGNIFICANT CHANGE UP (ref 5–17)
ANION GAP SERPL CALC-SCNC: 12 MMOL/L — SIGNIFICANT CHANGE UP (ref 5–17)
APPEARANCE UR: ABNORMAL
APPEARANCE UR: ABNORMAL
AST SERPL-CCNC: 36 U/L — SIGNIFICANT CHANGE UP (ref 10–40)
AST SERPL-CCNC: 36 U/L — SIGNIFICANT CHANGE UP (ref 10–40)
BACTERIA # UR AUTO: ABNORMAL /HPF
BACTERIA # UR AUTO: ABNORMAL /HPF
BASE EXCESS BLDV CALC-SCNC: 3.4 MMOL/L — HIGH (ref -2–3)
BASE EXCESS BLDV CALC-SCNC: 3.4 MMOL/L — HIGH (ref -2–3)
BASE EXCESS BLDV CALC-SCNC: 4.5 MMOL/L — HIGH (ref -2–3)
BASE EXCESS BLDV CALC-SCNC: 4.5 MMOL/L — HIGH (ref -2–3)
BASE EXCESS BLDV CALC-SCNC: 4.7 MMOL/L — HIGH (ref -2–3)
BASE EXCESS BLDV CALC-SCNC: 4.7 MMOL/L — HIGH (ref -2–3)
BASOPHILS # BLD AUTO: 0.01 K/UL — SIGNIFICANT CHANGE UP (ref 0–0.2)
BASOPHILS # BLD AUTO: 0.01 K/UL — SIGNIFICANT CHANGE UP (ref 0–0.2)
BASOPHILS NFR BLD AUTO: 0.1 % — SIGNIFICANT CHANGE UP (ref 0–2)
BASOPHILS NFR BLD AUTO: 0.1 % — SIGNIFICANT CHANGE UP (ref 0–2)
BILIRUB SERPL-MCNC: 0.4 MG/DL — SIGNIFICANT CHANGE UP (ref 0.2–1.2)
BILIRUB SERPL-MCNC: 0.4 MG/DL — SIGNIFICANT CHANGE UP (ref 0.2–1.2)
BILIRUB UR-MCNC: NEGATIVE — SIGNIFICANT CHANGE UP
BILIRUB UR-MCNC: NEGATIVE — SIGNIFICANT CHANGE UP
BLD GP AB SCN SERPL QL: NEGATIVE — SIGNIFICANT CHANGE UP
BLD GP AB SCN SERPL QL: NEGATIVE — SIGNIFICANT CHANGE UP
BUN SERPL-MCNC: 17 MG/DL — SIGNIFICANT CHANGE UP (ref 7–23)
CA-I SERPL-SCNC: 1.18 MMOL/L — SIGNIFICANT CHANGE UP (ref 1.15–1.33)
CA-I SERPL-SCNC: 1.18 MMOL/L — SIGNIFICANT CHANGE UP (ref 1.15–1.33)
CA-I SERPL-SCNC: 1.19 MMOL/L — SIGNIFICANT CHANGE UP (ref 1.15–1.33)
CALCIUM SERPL-MCNC: 8.9 MG/DL — SIGNIFICANT CHANGE UP (ref 8.4–10.5)
CALCIUM SERPL-MCNC: 8.9 MG/DL — SIGNIFICANT CHANGE UP (ref 8.4–10.5)
CALCIUM SERPL-MCNC: 9.2 MG/DL — SIGNIFICANT CHANGE UP (ref 8.4–10.5)
CALCIUM SERPL-MCNC: 9.2 MG/DL — SIGNIFICANT CHANGE UP (ref 8.4–10.5)
CAST: 2 /LPF — SIGNIFICANT CHANGE UP (ref 0–4)
CAST: 2 /LPF — SIGNIFICANT CHANGE UP (ref 0–4)
CHLORIDE BLDV-SCNC: 97 MMOL/L — SIGNIFICANT CHANGE UP (ref 96–108)
CHLORIDE BLDV-SCNC: 97 MMOL/L — SIGNIFICANT CHANGE UP (ref 96–108)
CHLORIDE BLDV-SCNC: 98 MMOL/L — SIGNIFICANT CHANGE UP (ref 96–108)
CHLORIDE SERPL-SCNC: 100 MMOL/L — SIGNIFICANT CHANGE UP (ref 96–108)
CHLORIDE SERPL-SCNC: 100 MMOL/L — SIGNIFICANT CHANGE UP (ref 96–108)
CHLORIDE SERPL-SCNC: 98 MMOL/L — SIGNIFICANT CHANGE UP (ref 96–108)
CHLORIDE SERPL-SCNC: 98 MMOL/L — SIGNIFICANT CHANGE UP (ref 96–108)
CO2 BLDV-SCNC: 30 MMOL/L — HIGH (ref 22–26)
CO2 BLDV-SCNC: 30 MMOL/L — HIGH (ref 22–26)
CO2 BLDV-SCNC: 31 MMOL/L — HIGH (ref 22–26)
CO2 SERPL-SCNC: 25 MMOL/L — SIGNIFICANT CHANGE UP (ref 22–31)
CO2 SERPL-SCNC: 25 MMOL/L — SIGNIFICANT CHANGE UP (ref 22–31)
CO2 SERPL-SCNC: 26 MMOL/L — SIGNIFICANT CHANGE UP (ref 22–31)
CO2 SERPL-SCNC: 26 MMOL/L — SIGNIFICANT CHANGE UP (ref 22–31)
COLOR SPEC: YELLOW — SIGNIFICANT CHANGE UP
COLOR SPEC: YELLOW — SIGNIFICANT CHANGE UP
CREAT SERPL-MCNC: 0.51 MG/DL — SIGNIFICANT CHANGE UP (ref 0.5–1.3)
CREAT SERPL-MCNC: 0.51 MG/DL — SIGNIFICANT CHANGE UP (ref 0.5–1.3)
CREAT SERPL-MCNC: 0.54 MG/DL — SIGNIFICANT CHANGE UP (ref 0.5–1.3)
CREAT SERPL-MCNC: 0.54 MG/DL — SIGNIFICANT CHANGE UP (ref 0.5–1.3)
DIFF PNL FLD: ABNORMAL
DIFF PNL FLD: ABNORMAL
EGFR: 94 ML/MIN/1.73M2 — SIGNIFICANT CHANGE UP
EGFR: 94 ML/MIN/1.73M2 — SIGNIFICANT CHANGE UP
EGFR: 95 ML/MIN/1.73M2 — SIGNIFICANT CHANGE UP
EGFR: 95 ML/MIN/1.73M2 — SIGNIFICANT CHANGE UP
EOSINOPHIL # BLD AUTO: 0 K/UL — SIGNIFICANT CHANGE UP (ref 0–0.5)
EOSINOPHIL # BLD AUTO: 0 K/UL — SIGNIFICANT CHANGE UP (ref 0–0.5)
EOSINOPHIL NFR BLD AUTO: 0 % — SIGNIFICANT CHANGE UP (ref 0–6)
EOSINOPHIL NFR BLD AUTO: 0 % — SIGNIFICANT CHANGE UP (ref 0–6)
GAS PNL BLDV: 133 MMOL/L — LOW (ref 136–145)
GAS PNL BLDV: SIGNIFICANT CHANGE UP
GLUCOSE BLDV-MCNC: 117 MG/DL — HIGH (ref 70–99)
GLUCOSE BLDV-MCNC: 117 MG/DL — HIGH (ref 70–99)
GLUCOSE BLDV-MCNC: 121 MG/DL — HIGH (ref 70–99)
GLUCOSE BLDV-MCNC: 121 MG/DL — HIGH (ref 70–99)
GLUCOSE BLDV-MCNC: 144 MG/DL — HIGH (ref 70–99)
GLUCOSE BLDV-MCNC: 144 MG/DL — HIGH (ref 70–99)
GLUCOSE SERPL-MCNC: 125 MG/DL — HIGH (ref 70–99)
GLUCOSE SERPL-MCNC: 125 MG/DL — HIGH (ref 70–99)
GLUCOSE SERPL-MCNC: 140 MG/DL — HIGH (ref 70–99)
GLUCOSE SERPL-MCNC: 140 MG/DL — HIGH (ref 70–99)
GLUCOSE UR QL: NEGATIVE MG/DL — SIGNIFICANT CHANGE UP
GLUCOSE UR QL: NEGATIVE MG/DL — SIGNIFICANT CHANGE UP
HCO3 BLDV-SCNC: 28 MMOL/L — SIGNIFICANT CHANGE UP (ref 22–29)
HCO3 BLDV-SCNC: 28 MMOL/L — SIGNIFICANT CHANGE UP (ref 22–29)
HCO3 BLDV-SCNC: 30 MMOL/L — HIGH (ref 22–29)
HCT VFR BLD CALC: 38.5 % — SIGNIFICANT CHANGE UP (ref 34.5–45)
HCT VFR BLD CALC: 38.5 % — SIGNIFICANT CHANGE UP (ref 34.5–45)
HCT VFR BLD CALC: 42.7 % — SIGNIFICANT CHANGE UP (ref 34.5–45)
HCT VFR BLD CALC: 42.7 % — SIGNIFICANT CHANGE UP (ref 34.5–45)
HCT VFR BLDA CALC: 40 % — SIGNIFICANT CHANGE UP (ref 34.5–46.5)
HCT VFR BLDA CALC: 40 % — SIGNIFICANT CHANGE UP (ref 34.5–46.5)
HCT VFR BLDA CALC: 41 % — SIGNIFICANT CHANGE UP (ref 34.5–46.5)
HCT VFR BLDA CALC: 41 % — SIGNIFICANT CHANGE UP (ref 34.5–46.5)
HCT VFR BLDA CALC: 45 % — SIGNIFICANT CHANGE UP (ref 34.5–46.5)
HCT VFR BLDA CALC: 45 % — SIGNIFICANT CHANGE UP (ref 34.5–46.5)
HGB BLD CALC-MCNC: 13.4 G/DL — SIGNIFICANT CHANGE UP (ref 11.7–16.1)
HGB BLD CALC-MCNC: 13.4 G/DL — SIGNIFICANT CHANGE UP (ref 11.7–16.1)
HGB BLD CALC-MCNC: 13.8 G/DL — SIGNIFICANT CHANGE UP (ref 11.7–16.1)
HGB BLD CALC-MCNC: 13.8 G/DL — SIGNIFICANT CHANGE UP (ref 11.7–16.1)
HGB BLD CALC-MCNC: 15 G/DL — SIGNIFICANT CHANGE UP (ref 11.7–16.1)
HGB BLD CALC-MCNC: 15 G/DL — SIGNIFICANT CHANGE UP (ref 11.7–16.1)
HGB BLD-MCNC: 13.1 G/DL — SIGNIFICANT CHANGE UP (ref 11.5–15.5)
HGB BLD-MCNC: 13.1 G/DL — SIGNIFICANT CHANGE UP (ref 11.5–15.5)
HGB BLD-MCNC: 14.2 G/DL — SIGNIFICANT CHANGE UP (ref 11.5–15.5)
HGB BLD-MCNC: 14.2 G/DL — SIGNIFICANT CHANGE UP (ref 11.5–15.5)
HOROWITZ INDEX BLDV+IHG-RTO: SIGNIFICANT CHANGE UP
HOROWITZ INDEX BLDV+IHG-RTO: SIGNIFICANT CHANGE UP
IMM GRANULOCYTES NFR BLD AUTO: 0.4 % — SIGNIFICANT CHANGE UP (ref 0–0.9)
IMM GRANULOCYTES NFR BLD AUTO: 0.4 % — SIGNIFICANT CHANGE UP (ref 0–0.9)
KETONES UR-MCNC: NEGATIVE MG/DL — SIGNIFICANT CHANGE UP
KETONES UR-MCNC: NEGATIVE MG/DL — SIGNIFICANT CHANGE UP
LACTATE BLDV-MCNC: 2.2 MMOL/L — HIGH (ref 0.5–2)
LACTATE BLDV-MCNC: 2.2 MMOL/L — HIGH (ref 0.5–2)
LACTATE BLDV-MCNC: 2.5 MMOL/L — HIGH (ref 0.5–2)
LACTATE BLDV-MCNC: 2.5 MMOL/L — HIGH (ref 0.5–2)
LACTATE BLDV-MCNC: 3.3 MMOL/L — HIGH (ref 0.5–2)
LACTATE BLDV-MCNC: 3.3 MMOL/L — HIGH (ref 0.5–2)
LEUKOCYTE ESTERASE UR-ACNC: ABNORMAL
LEUKOCYTE ESTERASE UR-ACNC: ABNORMAL
LIDOCAIN IGE QN: 18 U/L — SIGNIFICANT CHANGE UP (ref 7–60)
LIDOCAIN IGE QN: 18 U/L — SIGNIFICANT CHANGE UP (ref 7–60)
LYMPHOCYTES # BLD AUTO: 1.38 K/UL — SIGNIFICANT CHANGE UP (ref 1–3.3)
LYMPHOCYTES # BLD AUTO: 1.38 K/UL — SIGNIFICANT CHANGE UP (ref 1–3.3)
LYMPHOCYTES # BLD AUTO: 12.1 % — LOW (ref 13–44)
LYMPHOCYTES # BLD AUTO: 12.1 % — LOW (ref 13–44)
MCHC RBC-ENTMCNC: 30.3 PG — SIGNIFICANT CHANGE UP (ref 27–34)
MCHC RBC-ENTMCNC: 30.3 PG — SIGNIFICANT CHANGE UP (ref 27–34)
MCHC RBC-ENTMCNC: 31.6 PG — SIGNIFICANT CHANGE UP (ref 27–34)
MCHC RBC-ENTMCNC: 31.6 PG — SIGNIFICANT CHANGE UP (ref 27–34)
MCHC RBC-ENTMCNC: 33.3 GM/DL — SIGNIFICANT CHANGE UP (ref 32–36)
MCHC RBC-ENTMCNC: 33.3 GM/DL — SIGNIFICANT CHANGE UP (ref 32–36)
MCHC RBC-ENTMCNC: 34 GM/DL — SIGNIFICANT CHANGE UP (ref 32–36)
MCHC RBC-ENTMCNC: 34 GM/DL — SIGNIFICANT CHANGE UP (ref 32–36)
MCV RBC AUTO: 91.2 FL — SIGNIFICANT CHANGE UP (ref 80–100)
MCV RBC AUTO: 91.2 FL — SIGNIFICANT CHANGE UP (ref 80–100)
MCV RBC AUTO: 92.8 FL — SIGNIFICANT CHANGE UP (ref 80–100)
MCV RBC AUTO: 92.8 FL — SIGNIFICANT CHANGE UP (ref 80–100)
MONOCYTES # BLD AUTO: 0.54 K/UL — SIGNIFICANT CHANGE UP (ref 0–0.9)
MONOCYTES # BLD AUTO: 0.54 K/UL — SIGNIFICANT CHANGE UP (ref 0–0.9)
MONOCYTES NFR BLD AUTO: 4.7 % — SIGNIFICANT CHANGE UP (ref 2–14)
MONOCYTES NFR BLD AUTO: 4.7 % — SIGNIFICANT CHANGE UP (ref 2–14)
NEUTROPHILS # BLD AUTO: 9.43 K/UL — HIGH (ref 1.8–7.4)
NEUTROPHILS # BLD AUTO: 9.43 K/UL — HIGH (ref 1.8–7.4)
NEUTROPHILS NFR BLD AUTO: 82.7 % — HIGH (ref 43–77)
NEUTROPHILS NFR BLD AUTO: 82.7 % — HIGH (ref 43–77)
NITRITE UR-MCNC: POSITIVE
NITRITE UR-MCNC: POSITIVE
NRBC # BLD: 0 /100 WBCS — SIGNIFICANT CHANGE UP (ref 0–0)
PCO2 BLDV: 44 MMHG — HIGH (ref 39–42)
PCO2 BLDV: 44 MMHG — HIGH (ref 39–42)
PCO2 BLDV: 45 MMHG — HIGH (ref 39–42)
PCO2 BLDV: 45 MMHG — HIGH (ref 39–42)
PCO2 BLDV: 46 MMHG — HIGH (ref 39–42)
PCO2 BLDV: 46 MMHG — HIGH (ref 39–42)
PH BLDV: 7.42 — SIGNIFICANT CHANGE UP (ref 7.32–7.43)
PH BLDV: 7.43 — SIGNIFICANT CHANGE UP (ref 7.32–7.43)
PH BLDV: 7.43 — SIGNIFICANT CHANGE UP (ref 7.32–7.43)
PH UR: 6 — SIGNIFICANT CHANGE UP (ref 5–8)
PH UR: 6 — SIGNIFICANT CHANGE UP (ref 5–8)
PLATELET # BLD AUTO: 299 K/UL — SIGNIFICANT CHANGE UP (ref 150–400)
PLATELET # BLD AUTO: 299 K/UL — SIGNIFICANT CHANGE UP (ref 150–400)
PLATELET # BLD AUTO: 335 K/UL — SIGNIFICANT CHANGE UP (ref 150–400)
PLATELET # BLD AUTO: 335 K/UL — SIGNIFICANT CHANGE UP (ref 150–400)
PO2 BLDV: 47 MMHG — HIGH (ref 25–45)
PO2 BLDV: 47 MMHG — HIGH (ref 25–45)
PO2 BLDV: 48 MMHG — HIGH (ref 25–45)
PO2 BLDV: 48 MMHG — HIGH (ref 25–45)
PO2 BLDV: 73 MMHG — HIGH (ref 25–45)
PO2 BLDV: 73 MMHG — HIGH (ref 25–45)
POTASSIUM BLDV-SCNC: 3.8 MMOL/L — SIGNIFICANT CHANGE UP (ref 3.5–5.1)
POTASSIUM BLDV-SCNC: 3.8 MMOL/L — SIGNIFICANT CHANGE UP (ref 3.5–5.1)
POTASSIUM BLDV-SCNC: 3.9 MMOL/L — SIGNIFICANT CHANGE UP (ref 3.5–5.1)
POTASSIUM BLDV-SCNC: 3.9 MMOL/L — SIGNIFICANT CHANGE UP (ref 3.5–5.1)
POTASSIUM BLDV-SCNC: 4.1 MMOL/L — SIGNIFICANT CHANGE UP (ref 3.5–5.1)
POTASSIUM BLDV-SCNC: 4.1 MMOL/L — SIGNIFICANT CHANGE UP (ref 3.5–5.1)
POTASSIUM SERPL-MCNC: 3.9 MMOL/L — SIGNIFICANT CHANGE UP (ref 3.5–5.3)
POTASSIUM SERPL-SCNC: 3.9 MMOL/L — SIGNIFICANT CHANGE UP (ref 3.5–5.3)
PROT SERPL-MCNC: 7.1 G/DL — SIGNIFICANT CHANGE UP (ref 6–8.3)
PROT SERPL-MCNC: 7.1 G/DL — SIGNIFICANT CHANGE UP (ref 6–8.3)
PROT UR-MCNC: 100 MG/DL
PROT UR-MCNC: 100 MG/DL
RBC # BLD: 4.15 M/UL — SIGNIFICANT CHANGE UP (ref 3.8–5.2)
RBC # BLD: 4.15 M/UL — SIGNIFICANT CHANGE UP (ref 3.8–5.2)
RBC # BLD: 4.68 M/UL — SIGNIFICANT CHANGE UP (ref 3.8–5.2)
RBC # BLD: 4.68 M/UL — SIGNIFICANT CHANGE UP (ref 3.8–5.2)
RBC # FLD: 14 % — SIGNIFICANT CHANGE UP (ref 10.3–14.5)
RBC CASTS # UR COMP ASSIST: 25 /HPF — HIGH (ref 0–4)
RBC CASTS # UR COMP ASSIST: 25 /HPF — HIGH (ref 0–4)
RH IG SCN BLD-IMP: POSITIVE — SIGNIFICANT CHANGE UP
RH IG SCN BLD-IMP: POSITIVE — SIGNIFICANT CHANGE UP
SAO2 % BLDV: 76.8 % — SIGNIFICANT CHANGE UP (ref 67–88)
SAO2 % BLDV: 76.8 % — SIGNIFICANT CHANGE UP (ref 67–88)
SAO2 % BLDV: 80.7 % — SIGNIFICANT CHANGE UP (ref 67–88)
SAO2 % BLDV: 80.7 % — SIGNIFICANT CHANGE UP (ref 67–88)
SAO2 % BLDV: 95.3 % — HIGH (ref 67–88)
SAO2 % BLDV: 95.3 % — HIGH (ref 67–88)
SODIUM SERPL-SCNC: 135 MMOL/L — SIGNIFICANT CHANGE UP (ref 135–145)
SODIUM SERPL-SCNC: 135 MMOL/L — SIGNIFICANT CHANGE UP (ref 135–145)
SODIUM SERPL-SCNC: 136 MMOL/L — SIGNIFICANT CHANGE UP (ref 135–145)
SODIUM SERPL-SCNC: 136 MMOL/L — SIGNIFICANT CHANGE UP (ref 135–145)
SP GR SPEC: 1.02 — SIGNIFICANT CHANGE UP (ref 1–1.03)
SP GR SPEC: 1.02 — SIGNIFICANT CHANGE UP (ref 1–1.03)
SQUAMOUS # UR AUTO: 2 /HPF — SIGNIFICANT CHANGE UP (ref 0–5)
SQUAMOUS # UR AUTO: 2 /HPF — SIGNIFICANT CHANGE UP (ref 0–5)
UROBILINOGEN FLD QL: 0.2 MG/DL — SIGNIFICANT CHANGE UP (ref 0.2–1)
UROBILINOGEN FLD QL: 0.2 MG/DL — SIGNIFICANT CHANGE UP (ref 0.2–1)
WBC # BLD: 10.47 K/UL — SIGNIFICANT CHANGE UP (ref 3.8–10.5)
WBC # BLD: 10.47 K/UL — SIGNIFICANT CHANGE UP (ref 3.8–10.5)
WBC # BLD: 11.41 K/UL — HIGH (ref 3.8–10.5)
WBC # BLD: 11.41 K/UL — HIGH (ref 3.8–10.5)
WBC # FLD AUTO: 10.47 K/UL — SIGNIFICANT CHANGE UP (ref 3.8–10.5)
WBC # FLD AUTO: 10.47 K/UL — SIGNIFICANT CHANGE UP (ref 3.8–10.5)
WBC # FLD AUTO: 11.41 K/UL — HIGH (ref 3.8–10.5)
WBC # FLD AUTO: 11.41 K/UL — HIGH (ref 3.8–10.5)
WBC UR QL: 295 /HPF — HIGH (ref 0–5)
WBC UR QL: 295 /HPF — HIGH (ref 0–5)

## 2024-01-09 PROCEDURE — 99221 1ST HOSP IP/OBS SF/LOW 40: CPT

## 2024-01-09 PROCEDURE — 99285 EMERGENCY DEPT VISIT HI MDM: CPT

## 2024-01-09 PROCEDURE — 93975 VASCULAR STUDY: CPT | Mod: 26

## 2024-01-09 PROCEDURE — 71046 X-RAY EXAM CHEST 2 VIEWS: CPT | Mod: 26

## 2024-01-09 PROCEDURE — 99253 IP/OBS CNSLTJ NEW/EST LOW 45: CPT

## 2024-01-09 PROCEDURE — 74174 CTA ABD&PLVS W/CONTRAST: CPT | Mod: 26,MA

## 2024-01-09 RX ORDER — MORPHINE SULFATE 50 MG/1
4 CAPSULE, EXTENDED RELEASE ORAL ONCE
Refills: 0 | Status: DISCONTINUED | OUTPATIENT
Start: 2024-01-09 | End: 2024-01-09

## 2024-01-09 RX ORDER — ACETAMINOPHEN 500 MG
1000 TABLET ORAL ONCE
Refills: 0 | Status: COMPLETED | OUTPATIENT
Start: 2024-01-09 | End: 2024-01-09

## 2024-01-09 RX ORDER — SODIUM CHLORIDE 9 MG/ML
1000 INJECTION, SOLUTION INTRAVENOUS ONCE
Refills: 0 | Status: COMPLETED | OUTPATIENT
Start: 2024-01-09 | End: 2024-01-09

## 2024-01-09 RX ORDER — VALSARTAN 80 MG/1
1 TABLET ORAL
Refills: 0 | DISCHARGE

## 2024-01-09 RX ORDER — CEFTRIAXONE 500 MG/1
1000 INJECTION, POWDER, FOR SOLUTION INTRAMUSCULAR; INTRAVENOUS EVERY 24 HOURS
Refills: 0 | Status: COMPLETED | OUTPATIENT
Start: 2024-01-09 | End: 2024-01-11

## 2024-01-09 RX ORDER — VALSARTAN 80 MG/1
160 TABLET ORAL DAILY
Refills: 0 | Status: DISCONTINUED | OUTPATIENT
Start: 2024-01-09 | End: 2024-01-13

## 2024-01-09 RX ORDER — HEPARIN SODIUM 5000 [USP'U]/ML
5000 INJECTION INTRAVENOUS; SUBCUTANEOUS EVERY 8 HOURS
Refills: 0 | Status: DISCONTINUED | OUTPATIENT
Start: 2024-01-09 | End: 2024-01-13

## 2024-01-09 RX ORDER — FAMOTIDINE 10 MG/ML
20 INJECTION INTRAVENOUS ONCE
Refills: 0 | Status: COMPLETED | OUTPATIENT
Start: 2024-01-09 | End: 2024-01-09

## 2024-01-09 RX ORDER — CEFTRIAXONE 500 MG/1
1000 INJECTION, POWDER, FOR SOLUTION INTRAMUSCULAR; INTRAVENOUS ONCE
Refills: 0 | Status: COMPLETED | OUTPATIENT
Start: 2024-01-09 | End: 2024-01-09

## 2024-01-09 RX ADMIN — HEPARIN SODIUM 5000 UNIT(S): 5000 INJECTION INTRAVENOUS; SUBCUTANEOUS at 22:43

## 2024-01-09 RX ADMIN — MORPHINE SULFATE 4 MILLIGRAM(S): 50 CAPSULE, EXTENDED RELEASE ORAL at 06:04

## 2024-01-09 RX ADMIN — CEFTRIAXONE 100 MILLIGRAM(S): 500 INJECTION, POWDER, FOR SOLUTION INTRAMUSCULAR; INTRAVENOUS at 10:23

## 2024-01-09 RX ADMIN — Medication 400 MILLIGRAM(S): at 07:51

## 2024-01-09 RX ADMIN — SODIUM CHLORIDE 1000 MILLILITER(S): 9 INJECTION, SOLUTION INTRAVENOUS at 07:42

## 2024-01-09 RX ADMIN — FAMOTIDINE 20 MILLIGRAM(S): 10 INJECTION INTRAVENOUS at 06:09

## 2024-01-09 RX ADMIN — CEFTRIAXONE 100 MILLIGRAM(S): 500 INJECTION, POWDER, FOR SOLUTION INTRAMUSCULAR; INTRAVENOUS at 20:42

## 2024-01-09 RX ADMIN — VALSARTAN 160 MILLIGRAM(S): 80 TABLET ORAL at 20:35

## 2024-01-09 NOTE — H&P ADULT - NSHPLABSRESULTS_GEN_ALL_CORE
LABS:                        13.1   10.47 )-----------( 299      ( 09 Jan 2024 15:53 )             38.5     01-09    136  |  100  |  17  ----------------------------<  125<H>  3.9   |  26  |  0.51    Ca    8.9      09 Jan 2024 15:53    TPro  7.1  /  Alb  4.2  /  TBili  0.4  /  DBili  x   /  AST  36  /  ALT  91<H>  /  AlkPhos  97  01-09      Urinalysis Basic - ( 09 Jan 2024 15:53 )    Color: x / Appearance: x / SG: x / pH: x  Gluc: 125 mg/dL / Ketone: x  / Bili: x / Urobili: x   Blood: x / Protein: x / Nitrite: x   Leuk Esterase: x / RBC: x / WBC x   Sq Epi: x / Non Sq Epi: x / Bacteria: x            RADIOLOGY & ADDITIONAL TESTS:

## 2024-01-09 NOTE — ED ADULT NURSE REASSESSMENT NOTE - NS ED NURSE REASSESS COMMENT FT1
Pt observed sitting up in stretcher conversing with RN without difficulty. Breathing spontaneous and unlabored. Pt updated on plan of care awaiting bed assignment, RTM surg. No acute distress noted. Call bell within reach.

## 2024-01-09 NOTE — ED PROVIDER NOTE - ATTENDING CONTRIBUTION TO CARE
MD Vaughn:  patient seen and evaluated personally.   I agree with the History & Physical,  Impression & Plan other than what was detailed in my note.  MD Vaughn  79 y/o f hx of vaginal ca, on chemo, presenting to ed w/ intermittent abdominal pain, intermittent x past month, last felt two days ago then felt today, on left side, and going across stomach then goes down stomach, has taken pepto bismol, for pain but does not help, nausea, no vomiting, pos diarrhea, pain is severe, no known hx of k g stones, afebrile vitals stable, pt is ill appearing, seems uncomfortable, heart /lung sounds unremarkable, pos ttp throughout abd, reported as distended, plan for pain meds, cbc, cmp, ct abd re evaluate.

## 2024-01-09 NOTE — ED ADULT NURSE REASSESSMENT NOTE - NS ED NURSE REASSESS COMMENT FT1
Pt observed sitting up in stretcher conversing with RN without difficulty. Breathing spontaneous and unlabored. Pt updated on plan of care awaiting bed assignment, RTM Med. Pt medicated with night time meds, no tasks pending. No acute distress noted, son at bedside.

## 2024-01-09 NOTE — CONSULT NOTE ADULT - ASSESSMENT
78 year old female with PMH melanoma (vaginal), HTN, constipation, presents with waxing and waning abdominal pain and pain after eating for months, recently the abdominal pain acutely worsened in the last 3 days and patient presented to ED. Pain is reported to occur about an hour after eating, patient has also lost some weight per her family because her clothes are fitting looser. Exam with soft NTND abdomen. Labs with lactate 3.3->2.5 after 1L bolus. 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 Vascular Surgery consulted for SMA stenosis, history and exam concerning for possible chronic mesenteric ischemia.    Plan:  - No acute intervention  - patient should start on ASA and statin  - please obtain mesenteric duplex  - repeat labs to trend lactate  - recommend medicine admission  - oncology consult iso vaginal melanoma undergoing chemo/immunotherapy  - pain control prn    Discussed with Fellow Dr. Ji on behalf of Dr. Mckeon    [id number] 78 year old female with PMH melanoma (vaginal), HTN, constipation, presents with waxing and waning abdominal pain and pain after eating for months, recently the abdominal pain acutely worsened in the last 3 days and patient presented to ED. Pain is reported to occur about an hour after eating, patient has also lost some weight per her family because her clothes are fitting looser. Exam with soft NTND abdomen. Labs with lactate 3.3->2.5 after 1L bolus. 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 Vascular Surgery consulted for SMA stenosis, history and exam concerning for possible chronic mesenteric ischemia.    Plan:  - No acute intervention  - patient should start on ASA and statin  - please obtain mesenteric duplex  - repeat labs to trend lactate  - recommend medicine admission  - oncology consult iso vaginal melanoma undergoing chemo/immunotherapy  - pain control prn    Discussed with Fellow Dr. Ji on behalf of Dr. Mckeon    [id number]

## 2024-01-09 NOTE — ED ADULT NURSE NOTE - OBJECTIVE STATEMENT
Pt is 78Y F, pmhx HTN, HLD, vaginal cancer last treatment 1 month ago, c/o diffuse abdominal pain, nausea, watery stools for 2 days, pt primarily Yi speaking, pt offered  svc, pt daughter at bedside preferred , pt states pain came on suddenly, diffuse pain, pain upon palpation of RUQ/RLQ,  no vomiting, nausea, able to tolerate PO, pt states she had "many watery stools" yesterday, less today, pt abdomen distended and tender, no fever or chills no other symptoms, pt A&Ox4, ambulatory, updated on plan of care Pt is 78Y F, pmhx HTN, HLD, vaginal cancer last treatment 1 month ago, c/o diffuse abdominal pain, nausea, watery stools for 2 days, pt primarily Khmer speaking, pt offered  svc, pt daughter at bedside preferred , pt states pain came on suddenly, diffuse pain, pain upon palpation of RUQ/RLQ,  no vomiting, nausea, able to tolerate PO, pt states she had "many watery stools" yesterday, less today, pt abdomen distended and tender, no fever or chills no other symptoms, pt A&Ox4, ambulatory, updated on plan of care

## 2024-01-09 NOTE — ED PROVIDER NOTE - CLINICAL SUMMARY MEDICAL DECISION MAKING FREE TEXT BOX
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  Patient also had an episode of diarrhea, is not taking any antibiotics.  Denying any chest pain, shortness of breath, nausea, vomiting.  No fevers no chills.  Does not take any AC.    Differential and evaluation includes but is not limited to patient having mesenteric ischemia versus colitis versus gastritis vs diverticulitis.   Will give pain meds and reevaluate.

## 2024-01-09 NOTE — ED PROVIDER NOTE - SHIFT CHANGE DETAILS
Attending MD Byrnes: 78F w hx of vaginal melanoma on treatment, here with intermittent abdominal pain x 1 month, unimproved with GI meds, reports abdominal distention, here found to be diffusely tender, ?pain out of proportion, pending CTA AP, eval for mesenteric ischemia, SBO

## 2024-01-09 NOTE — ED ADULT NURSE NOTE - NSFALLUNIVINTERV_ED_ALL_ED
Bed/Stretcher in lowest position, wheels locked, appropriate side rails in place/Call bell, personal items and telephone in reach/Instruct patient to call for assistance before getting out of bed/chair/stretcher/Non-slip footwear applied when patient is off stretcher/[place] to call system/Physically safe environment - no spills, clutter or unnecessary equipment/Purposeful proactive rounding/Room/bathroom lighting operational, light cord in reach Bed/Stretcher in lowest position, wheels locked, appropriate side rails in place/Call bell, personal items and telephone in reach/Instruct patient to call for assistance before getting out of bed/chair/stretcher/Non-slip footwear applied when patient is off stretcher/[place] to call system/Physically safe environment - no spills, clutter or unnecessary equipment/Purposeful proactive rounding/Room/bathroom lighting operational, light cord in reach

## 2024-01-09 NOTE — H&P ADULT - NEGATIVE GENERAL SYMPTOMS
no fever/no chills/no sweating/no anorexia/no weight loss/no weight gain/no polydipsia/no malaise/no fatigue

## 2024-01-09 NOTE — ED ADULT NURSE NOTE - PAIN: PRESENCE, MLM
Patient advised. Verbalized understanding. Scheduled appt for 2/26/19. Appt scheduled for 45 minutes. Is that ok, or would you like less time? Thanks!     : Yash Nails DO (Physician    Please have pt follow up with me in the office for recent fa complains of pain/discomfort

## 2024-01-09 NOTE — ED PROVIDER NOTE - OBJECTIVE STATEMENT
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  Patient also had an episode of diarrhea, is not taking any antibiotics.  Denying any chest pain, shortness of breath, nausea, vomiting.  No fevers no chills.  Does not take any AC.

## 2024-01-09 NOTE — ED PROVIDER NOTE - PROGRESS NOTE DETAILS
Attending MD Byrnes: Called rads, requested attempts at expediting CTA given concern for mesenteric ischemia. Attending MD Byrnes: Spoke with Vascular sx in the Emergency Department, patient to get a mesenteric duplex but can proceed with admission to medicine.  Will admit.  Patient and daughter aware (in Polish). Attending MD Byrnes: Spoke with Vascular sx in the Emergency Department, patient to get a mesenteric duplex but can proceed with admission to medicine.  Will admit.  Patient and daughter aware (in Irish). Liv Nascimento, PGY-2: Admitted patient to Dr. Shook. Discussed pending ultrasound and repeat blood work recommended by surgery, and need for inpatient medicine team to follow results. Attending MD Byrnes: received follow up call from surgery, requesting repeat labs and onc while patient inpatient, admitting team informed by Dr. Nascimento, admitted to Dr. Shook.

## 2024-01-09 NOTE — CONSULT NOTE ADULT - SUBJECTIVE AND OBJECTIVE BOX
Surgery Consult Note  Attending: Linda  Service: Vascular  [id number]      HPI: 78y Female PMH HTN, melanoma of the vagina (last chemotherapy  per chart and patient now on unknown immunotherapy), constipation, PSH  (30 years ago), and right knee "screws" presents to ED with 8 days of abdominal pain. Patient reports she has waxing and waning pain that started 8 days ago. Per the patient's daughter however this pain has been present for months and she has the pain about an hour after eating. She also reports they have not weighed her but her clothes are fitting looser. Has not had any bloody stools, no claudication symptoms, does not take any anticoagulation.     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 Patients labs significant for lactate of 3.3 but reduced to 2.5 after 1L bolus. At the time of evaluation by vascular surgery the patient's abdominal pain was resolved after tylenol and morphine. Vascular Surgery consulted for SMA stenosis    PAST MEDICAL HISTORY:  PAST MEDICAL & SURGICAL HISTORY:  Hypertension      No significant past surgical history          ALLERGIES:  Allergies    No Known Allergies    Intolerances        SOCIAL HISTORY: Negative for tobacco, etoh, or drug use    FAMILY HISTORY:  FAMILY HISTORY:      PHYSICAL EXAM:  General: NAD, resting comfortably  HEENT: NC/AT, EOMI, normal hearing, no oral lesions, no LAD, neck supple  Pulmonary: normal resp effort, CTA-B  Cardiovascular: NSR, no murmurs  Abdominal: soft, ND/NT, no organomegaly, no guarding or rebound tenderness  Extremities: WWP, normal strength, no clubbing/cyanosis/edema, palpable B/L radial pulses, palpable DP pulses and PT pulses B/L.   Neuro: A/O x 3, CNs II-XII grossly intact, normal sensation, no focal deficits        VITAL SIGNS:  Vital Signs Last 24 Hrs  T(C): 36.8 (2024 12:02), Max: 37.2 (2024 03:12)  T(F): 98.2 (2024 12:02), Max: 98.9 (2024 03:12)  HR: 54 (2024 12:02) (51 - 71)  BP: 186/80 (2024 12:02) (156/65 - 190/76)  BP(mean): --  RR: 18 (2024 12:02) (17 - 19)  SpO2: 98% (2024 12:02) (94% - 98%)    Parameters below as of 2024 12:02  Patient On (Oxygen Delivery Method): room air        I&O's Summary      LABS:                        14.2   11.41 )-----------( 335      ( 2024 06:12 )             42.7         135  |  98  |  17  ----------------------------<  140<H>  3.9   |  25  |  0.54    Ca    9.2      2024 06:12    TPro  7.1  /  Alb  4.2  /  TBili  0.4  /  DBili  x   /  AST  36  /  ALT  91<H>  /  AlkPhos  97        Urinalysis Basic - ( 2024 09:49 )    Color: Yellow / Appearance: Cloudy / S.020 / pH: x  Gluc: x / Ketone: Negative mg/dL  / Bili: Negative / Urobili: 0.2 mg/dL   Blood: x / Protein: 100 mg/dL / Nitrite: Positive   Leuk Esterase: Large / RBC: 25 /HPF /  /HPF   Sq Epi: x / Non Sq Epi: 2 /HPF / Bacteria: Many /HPF      CAPILLARY BLOOD GLUCOSE        LIVER FUNCTIONS - ( 2024 06:12 )  Alb: 4.2 g/dL / Pro: 7.1 g/dL / ALK PHOS: 97 U/L / ALT: 91 U/L / AST: 36 U/L / GGT: x             CULTURES:      RADIOLOGY & ADDITIONAL STUDIES:      < from: CT Angio Abdomen and Pelvis w/ IV Cont (24 @ 09:15) >    ACC: [id number] EXAM:  CT ANGIO ABD PELV (W)AW IC   ORDERED BY: OTTO LANE     *** ADDENDUM # 1 ***    Addendum: Impression should also include:  Focal narrowing proximal SMA without evidence bowel ischemia    --- End of Report ---    *** END OF ADDENDUM # 1 ***      PROCEDURE DATE:  2024          INTERPRETATION:  CLINICAL INFORMATION: 78-year-old female with 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 CT scan 2023    CONTRAST/COMPLICATIONS:  IV Contrast: Omnipaque 350  90 cc administered   10 cc discarded  Oral Contrast: NONE  Complications: None reported at time of study completion    PROCEDURE:  CT Angiography of the Abdomen and Pelvis was performed.  Arterial and venous phases were acquired.  Sagittal and coronal reformats were performed as well as 3D (MIP)   reconstructions.    FINDINGS:  LOWER CHEST: Coronary artery calcification.    LIVER: Within normal limits.  BILE DUCTS: Normal caliber.  GALLBLADDER: Within normal limits.  SPLEEN: Within normal limits.  PANCREAS: Within normal limits.  ADRENALS: 2.3 x 1.9 cm indeterminate left adrenal nodule unchanged from   2023  KIDNEYS/URETERS: Bilateral parapelvic cysts.    BLADDER: Soft tissue mass 2.6 x 2.2 x 1.4 cm (605:73 and 304:114) at   anterior bladder base corresponding to previously demonstrated neoplasm   on MRI  REPRODUCTIVE ORGANS: Uterus and adnexa within normal limits.    BOWEL: No bowel obstruction. Appendix normal. No signs of bowel ischemia.   Colonic diverticula.  PERITONEUM: No ascites.  VESSELS: Severe focal narrowing proximal SMA (301:50). Otherwise patent   SMA. Patent celiac axis and branches, bilateral renal arteries, KARLA,   bilateral iliac and visualized femoral arteries.  RETROPERITONEUM/LYMPH NODES: Bilateral mildly enlarged inguinal lymph   nodes similar to 2023, 1.4 x 1.1 cm on the right and 1.7 x 1.2 cm   and the left  ABDOMINAL WALL: Within normal limits.  BONES: Within normal limits.    IMPRESSION:  Cause of abdominal pain not identified.  Unchanged left adrenal nodule.  Soft tissue mass anterior bladder base extending to the urethra similar   to prior MRI 2023.        --- End of Report ---    < end of copied text >             Surgery Consult Note  Attending: Linda  Service: Vascular  [id number]      HPI: 78y Female PMH HTN, melanoma of the vagina (last chemotherapy  per chart and patient now on unknown immunotherapy), constipation, PSH  (30 years ago), and right knee "screws" presents to ED with 8 days of abdominal pain. Patient reports she has waxing and waning pain that started 8 days ago. Per the patient's daughter however this pain has been present for months and she has the pain about an hour after eating. She also reports they have not weighed her but her clothes are fitting looser. Has not had any bloody stools, no claudication symptoms, does not take any anticoagulation.     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 Patients labs significant for lactate of 3.3 but reduced to 2.5 after 1L bolus. At the time of evaluation by vascular surgery the patient's abdominal pain was resolved after tylenol and morphine. Vascular Surgery consulted for SMA stenosis    PAST MEDICAL HISTORY:  PAST MEDICAL & SURGICAL HISTORY:  Hypertension      No significant past surgical history          ALLERGIES:  Allergies    No Known Allergies    Intolerances        SOCIAL HISTORY: Negative for tobacco, etoh, or drug use    FAMILY HISTORY:  FAMILY HISTORY:      PHYSICAL EXAM:  General: NAD, resting comfortably  HEENT: NC/AT, EOMI, normal hearing, no oral lesions, no LAD, neck supple  Pulmonary: normal resp effort, CTA-B  Cardiovascular: NSR, no murmurs  Abdominal: soft, ND/NT, no organomegaly, no guarding or rebound tenderness  Extremities: WWP, normal strength, no clubbing/cyanosis/edema, palpable B/L radial pulses, palpable DP pulses and PT pulses B/L.   Neuro: A/O x 3, CNs II-XII grossly intact, normal sensation, no focal deficits        VITAL SIGNS:  Vital Signs Last 24 Hrs  T(C): 36.8 (2024 12:02), Max: 37.2 (2024 03:12)  T(F): 98.2 (2024 12:02), Max: 98.9 (2024 03:12)  HR: 54 (2024 12:02) (51 - 71)  BP: 186/80 (2024 12:02) (156/65 - 190/76)  BP(mean): --  RR: 18 (2024 12:02) (17 - 19)  SpO2: 98% (2024 12:02) (94% - 98%)    Parameters below as of 2024 12:02  Patient On (Oxygen Delivery Method): room air        I&O's Summary      LABS:                        14.2   11.41 )-----------( 335      ( 2024 06:12 )             42.7         135  |  98  |  17  ----------------------------<  140<H>  3.9   |  25  |  0.54    Ca    9.2      2024 06:12    TPro  7.1  /  Alb  4.2  /  TBili  0.4  /  DBili  x   /  AST  36  /  ALT  91<H>  /  AlkPhos  97        Urinalysis Basic - ( 2024 09:49 )    Color: Yellow / Appearance: Cloudy / S.020 / pH: x  Gluc: x / Ketone: Negative mg/dL  / Bili: Negative / Urobili: 0.2 mg/dL   Blood: x / Protein: 100 mg/dL / Nitrite: Positive   Leuk Esterase: Large / RBC: 25 /HPF /  /HPF   Sq Epi: x / Non Sq Epi: 2 /HPF / Bacteria: Many /HPF      CAPILLARY BLOOD GLUCOSE        LIVER FUNCTIONS - ( 2024 06:12 )  Alb: 4.2 g/dL / Pro: 7.1 g/dL / ALK PHOS: 97 U/L / ALT: 91 U/L / AST: 36 U/L / GGT: x             CULTURES:      RADIOLOGY & ADDITIONAL STUDIES:      < from: CT Angio Abdomen and Pelvis w/ IV Cont (24 @ 09:15) >    ACC: [id number] EXAM:  CT ANGIO ABD PELV (W)AW IC   ORDERED BY: OTTO LANE     *** ADDENDUM # 1 ***    Addendum: Impression should also include:  Focal narrowing proximal SMA without evidence bowel ischemia    --- End of Report ---    *** END OF ADDENDUM # 1 ***      PROCEDURE DATE:  2024          INTERPRETATION:  CLINICAL INFORMATION: 78-year-old female with 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 CT scan 2023    CONTRAST/COMPLICATIONS:  IV Contrast: Omnipaque 350  90 cc administered   10 cc discarded  Oral Contrast: NONE  Complications: None reported at time of study completion    PROCEDURE:  CT Angiography of the Abdomen and Pelvis was performed.  Arterial and venous phases were acquired.  Sagittal and coronal reformats were performed as well as 3D (MIP)   reconstructions.    FINDINGS:  LOWER CHEST: Coronary artery calcification.    LIVER: Within normal limits.  BILE DUCTS: Normal caliber.  GALLBLADDER: Within normal limits.  SPLEEN: Within normal limits.  PANCREAS: Within normal limits.  ADRENALS: 2.3 x 1.9 cm indeterminate left adrenal nodule unchanged from   2023  KIDNEYS/URETERS: Bilateral parapelvic cysts.    BLADDER: Soft tissue mass 2.6 x 2.2 x 1.4 cm (605:73 and 304:114) at   anterior bladder base corresponding to previously demonstrated neoplasm   on MRI  REPRODUCTIVE ORGANS: Uterus and adnexa within normal limits.    BOWEL: No bowel obstruction. Appendix normal. No signs of bowel ischemia.   Colonic diverticula.  PERITONEUM: No ascites.  VESSELS: Severe focal narrowing proximal SMA (301:50). Otherwise patent   SMA. Patent celiac axis and branches, bilateral renal arteries, KARLA,   bilateral iliac and visualized femoral arteries.  RETROPERITONEUM/LYMPH NODES: Bilateral mildly enlarged inguinal lymph   nodes similar to 2023, 1.4 x 1.1 cm on the right and 1.7 x 1.2 cm   and the left  ABDOMINAL WALL: Within normal limits.  BONES: Within normal limits.    IMPRESSION:  Cause of abdominal pain not identified.  Unchanged left adrenal nodule.  Soft tissue mass anterior bladder base extending to the urethra similar   to prior MRI 2023.        --- End of Report ---    < end of copied text >

## 2024-01-09 NOTE — ED ADULT NURSE REASSESSMENT NOTE - NS ED NURSE REASSESS COMMENT FT1
Rpeort received from J[facility]ON [place] in [place]. Pt AxOx3,  observed sitting up in stretcher conversing with RN without difficulty. Breathing spontaneous and unlabored. Pt updated on plan of care awaiting further testing. Reporting pain relief. Surgery MD was at bedside. No acute distress noted. Call bell within reach.

## 2024-01-09 NOTE — H&P ADULT - ASSESSMENT
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  Patient also had an episode of diarrhea, is not taking any antibiotics.  Denying any chest pain, shortness of breath, nausea, vomiting.  No fevers no chills.  Does not take any AC.    abdominal pain  possible chronic mesenteric ischemia.  - US doppler mesentric is chrissy  - ASA  - FLP    UTI  - c/w ceftriaxone  - follow up cultures    vaginal melanoma  - follow up with oncology as out pt    HTN  - c/w valsartam    dvt px  - sq hreparin

## 2024-01-09 NOTE — ED PROVIDER NOTE - PHYSICAL EXAMINATION
Const: Well-nourished, Well-developed, appearing stated age.  Eyes: no conjunctival injection, and symmetrical lids.  HEENT: Head NCAT, no lesions. Atraumatic external nose and ears. Moist MM.  Neck: Symmetric, trachea midline.   CVS: +S1/S2   RESP: Unlabored respiratory effort. Clear to auscultation bilaterally.  GI:   Midepigastric and left upper quadrant abdominal pain.  MSK: Normocephalic/Atraumatic, Lower Extremities w/o calf tenderness or edema b/l.   Skin: Warm, dry and intact.   Neuro: CNs II-XII grossly intact. Motor & Sensation grossly intact.  Psych: Awake, Alert, & Oriented (AAO) x3. Appropriate mood and affect.

## 2024-01-09 NOTE — ED ADULT NURSE REASSESSMENT NOTE - NS ED NURSE REASSESS COMMENT FT1
Received patient from RN, patient at baseline mental status, able to make needs known, NAD, VSS, patient agreeable to plan of care, pending dispo, comfort and safety provided.

## 2024-01-09 NOTE — ED PROVIDER NOTE - RECEIVING PHYSICIAN
Fitz clifford [facility] Fitz clifford [facility]

## 2024-01-10 LAB
A1C WITH ESTIMATED AVERAGE GLUCOSE RESULT: 6.1 % — HIGH (ref 4–5.6)
A1C WITH ESTIMATED AVERAGE GLUCOSE RESULT: 6.1 % — HIGH (ref 4–5.6)
ANION GAP SERPL CALC-SCNC: 11 MMOL/L — SIGNIFICANT CHANGE UP (ref 5–17)
ANION GAP SERPL CALC-SCNC: 11 MMOL/L — SIGNIFICANT CHANGE UP (ref 5–17)
BUN SERPL-MCNC: 23 MG/DL — SIGNIFICANT CHANGE UP (ref 7–23)
BUN SERPL-MCNC: 23 MG/DL — SIGNIFICANT CHANGE UP (ref 7–23)
CALCIUM SERPL-MCNC: 8.9 MG/DL — SIGNIFICANT CHANGE UP (ref 8.4–10.5)
CALCIUM SERPL-MCNC: 8.9 MG/DL — SIGNIFICANT CHANGE UP (ref 8.4–10.5)
CHLORIDE SERPL-SCNC: 101 MMOL/L — SIGNIFICANT CHANGE UP (ref 96–108)
CHLORIDE SERPL-SCNC: 101 MMOL/L — SIGNIFICANT CHANGE UP (ref 96–108)
CHOLEST SERPL-MCNC: 263 MG/DL — HIGH
CHOLEST SERPL-MCNC: 263 MG/DL — HIGH
CO2 SERPL-SCNC: 26 MMOL/L — SIGNIFICANT CHANGE UP (ref 22–31)
CO2 SERPL-SCNC: 26 MMOL/L — SIGNIFICANT CHANGE UP (ref 22–31)
CREAT SERPL-MCNC: 0.59 MG/DL — SIGNIFICANT CHANGE UP (ref 0.5–1.3)
CREAT SERPL-MCNC: 0.59 MG/DL — SIGNIFICANT CHANGE UP (ref 0.5–1.3)
EGFR: 92 ML/MIN/1.73M2 — SIGNIFICANT CHANGE UP
EGFR: 92 ML/MIN/1.73M2 — SIGNIFICANT CHANGE UP
ESTIMATED AVERAGE GLUCOSE: 128 MG/DL — HIGH (ref 68–114)
ESTIMATED AVERAGE GLUCOSE: 128 MG/DL — HIGH (ref 68–114)
FOLATE SERPL-MCNC: 13.2 NG/ML — SIGNIFICANT CHANGE UP
FOLATE SERPL-MCNC: 13.2 NG/ML — SIGNIFICANT CHANGE UP
GLUCOSE SERPL-MCNC: 118 MG/DL — HIGH (ref 70–99)
GLUCOSE SERPL-MCNC: 118 MG/DL — HIGH (ref 70–99)
HCT VFR BLD CALC: 39.7 % — SIGNIFICANT CHANGE UP (ref 34.5–45)
HCT VFR BLD CALC: 39.7 % — SIGNIFICANT CHANGE UP (ref 34.5–45)
HDLC SERPL-MCNC: 54 MG/DL — SIGNIFICANT CHANGE UP
HDLC SERPL-MCNC: 54 MG/DL — SIGNIFICANT CHANGE UP
HGB BLD-MCNC: 13.1 G/DL — SIGNIFICANT CHANGE UP (ref 11.5–15.5)
HGB BLD-MCNC: 13.1 G/DL — SIGNIFICANT CHANGE UP (ref 11.5–15.5)
LACTATE SERPL-SCNC: 2 MMOL/L — SIGNIFICANT CHANGE UP (ref 0.5–2)
LACTATE SERPL-SCNC: 2 MMOL/L — SIGNIFICANT CHANGE UP (ref 0.5–2)
LIPID PNL WITH DIRECT LDL SERPL: 172 MG/DL — HIGH
LIPID PNL WITH DIRECT LDL SERPL: 172 MG/DL — HIGH
MAGNESIUM SERPL-MCNC: 2.4 MG/DL — SIGNIFICANT CHANGE UP (ref 1.6–2.6)
MAGNESIUM SERPL-MCNC: 2.4 MG/DL — SIGNIFICANT CHANGE UP (ref 1.6–2.6)
MCHC RBC-ENTMCNC: 31 PG — SIGNIFICANT CHANGE UP (ref 27–34)
MCHC RBC-ENTMCNC: 31 PG — SIGNIFICANT CHANGE UP (ref 27–34)
MCHC RBC-ENTMCNC: 33 GM/DL — SIGNIFICANT CHANGE UP (ref 32–36)
MCHC RBC-ENTMCNC: 33 GM/DL — SIGNIFICANT CHANGE UP (ref 32–36)
MCV RBC AUTO: 93.9 FL — SIGNIFICANT CHANGE UP (ref 80–100)
MCV RBC AUTO: 93.9 FL — SIGNIFICANT CHANGE UP (ref 80–100)
NON HDL CHOLESTEROL: 209 MG/DL — HIGH
NON HDL CHOLESTEROL: 209 MG/DL — HIGH
NRBC # BLD: 0 /100 WBCS — SIGNIFICANT CHANGE UP (ref 0–0)
NRBC # BLD: 0 /100 WBCS — SIGNIFICANT CHANGE UP (ref 0–0)
PHOSPHATE SERPL-MCNC: 4 MG/DL — SIGNIFICANT CHANGE UP (ref 2.5–4.5)
PHOSPHATE SERPL-MCNC: 4 MG/DL — SIGNIFICANT CHANGE UP (ref 2.5–4.5)
PLATELET # BLD AUTO: 320 K/UL — SIGNIFICANT CHANGE UP (ref 150–400)
PLATELET # BLD AUTO: 320 K/UL — SIGNIFICANT CHANGE UP (ref 150–400)
POTASSIUM SERPL-MCNC: 4 MMOL/L — SIGNIFICANT CHANGE UP (ref 3.5–5.3)
POTASSIUM SERPL-MCNC: 4 MMOL/L — SIGNIFICANT CHANGE UP (ref 3.5–5.3)
POTASSIUM SERPL-SCNC: 4 MMOL/L — SIGNIFICANT CHANGE UP (ref 3.5–5.3)
POTASSIUM SERPL-SCNC: 4 MMOL/L — SIGNIFICANT CHANGE UP (ref 3.5–5.3)
PROCALCITONIN SERPL-MCNC: 0.04 NG/ML — SIGNIFICANT CHANGE UP (ref 0.02–0.1)
PROCALCITONIN SERPL-MCNC: 0.04 NG/ML — SIGNIFICANT CHANGE UP (ref 0.02–0.1)
RBC # BLD: 4.23 M/UL — SIGNIFICANT CHANGE UP (ref 3.8–5.2)
RBC # BLD: 4.23 M/UL — SIGNIFICANT CHANGE UP (ref 3.8–5.2)
RBC # FLD: 14.3 % — SIGNIFICANT CHANGE UP (ref 10.3–14.5)
RBC # FLD: 14.3 % — SIGNIFICANT CHANGE UP (ref 10.3–14.5)
SODIUM SERPL-SCNC: 138 MMOL/L — SIGNIFICANT CHANGE UP (ref 135–145)
SODIUM SERPL-SCNC: 138 MMOL/L — SIGNIFICANT CHANGE UP (ref 135–145)
TRIGL SERPL-MCNC: 201 MG/DL — HIGH
TRIGL SERPL-MCNC: 201 MG/DL — HIGH
TSH SERPL-MCNC: 2.12 UIU/ML — SIGNIFICANT CHANGE UP (ref 0.27–4.2)
TSH SERPL-MCNC: 2.12 UIU/ML — SIGNIFICANT CHANGE UP (ref 0.27–4.2)
VIT B12 SERPL-MCNC: >2000 PG/ML — HIGH (ref 232–1245)
VIT B12 SERPL-MCNC: >2000 PG/ML — HIGH (ref 232–1245)
WBC # BLD: 9.18 K/UL — SIGNIFICANT CHANGE UP (ref 3.8–10.5)
WBC # BLD: 9.18 K/UL — SIGNIFICANT CHANGE UP (ref 3.8–10.5)
WBC # FLD AUTO: 9.18 K/UL — SIGNIFICANT CHANGE UP (ref 3.8–10.5)
WBC # FLD AUTO: 9.18 K/UL — SIGNIFICANT CHANGE UP (ref 3.8–10.5)

## 2024-01-10 PROCEDURE — 99232 SBSQ HOSP IP/OBS MODERATE 35: CPT

## 2024-01-10 RX ORDER — POLYETHYLENE GLYCOL 3350 17 G/17G
17 POWDER, FOR SOLUTION ORAL DAILY
Refills: 0 | Status: DISCONTINUED | OUTPATIENT
Start: 2024-01-10 | End: 2024-01-13

## 2024-01-10 RX ORDER — SENNA PLUS 8.6 MG/1
2 TABLET ORAL AT BEDTIME
Refills: 0 | Status: DISCONTINUED | OUTPATIENT
Start: 2024-01-10 | End: 2024-01-13

## 2024-01-10 RX ORDER — ATORVASTATIN CALCIUM 80 MG/1
40 TABLET, FILM COATED ORAL AT BEDTIME
Refills: 0 | Status: DISCONTINUED | OUTPATIENT
Start: 2024-01-10 | End: 2024-01-13

## 2024-01-10 RX ADMIN — ATORVASTATIN CALCIUM 40 MILLIGRAM(S): 80 TABLET, FILM COATED ORAL at 21:13

## 2024-01-10 RX ADMIN — HEPARIN SODIUM 5000 UNIT(S): 5000 INJECTION INTRAVENOUS; SUBCUTANEOUS at 21:13

## 2024-01-10 RX ADMIN — CEFTRIAXONE 100 MILLIGRAM(S): 500 INJECTION, POWDER, FOR SOLUTION INTRAMUSCULAR; INTRAVENOUS at 21:13

## 2024-01-10 RX ADMIN — SENNA PLUS 2 TABLET(S): 8.6 TABLET ORAL at 21:12

## 2024-01-10 RX ADMIN — VALSARTAN 160 MILLIGRAM(S): 80 TABLET ORAL at 18:22

## 2024-01-10 RX ADMIN — HEPARIN SODIUM 5000 UNIT(S): 5000 INJECTION INTRAVENOUS; SUBCUTANEOUS at 05:35

## 2024-01-10 RX ADMIN — POLYETHYLENE GLYCOL 3350 17 GRAM(S): 17 POWDER, FOR SOLUTION ORAL at 15:39

## 2024-01-10 RX ADMIN — HEPARIN SODIUM 5000 UNIT(S): 5000 INJECTION INTRAVENOUS; SUBCUTANEOUS at 14:16

## 2024-01-10 NOTE — PATIENT PROFILE ADULT - FALL HARM RISK - HARM RISK INTERVENTIONS
Assistance OOB with selected safe patient handling equipment/Communicate Risk of Fall with Harm to all staff/Discuss with provider need for PT consult/Monitor gait and stability/Provide patient with walking aids - walker, cane, crutches/Reinforce activity limits and safety measures with patient and family/Tailored Fall Risk Interventions/Visual Cue: Yellow wristband and red socks/Bed in lowest position, wheels locked, appropriate side rails in place/Call bell, personal items and telephone in reach/Instruct patient to call for assistance before getting out of bed or chair/Non-slip footwear when patient is out of bed/[place] to call system/Physically safe environment - no spills, clutter or unnecessary equipment/Purposeful Proactive Rounding/Room/bathroom lighting operational, light cord in reach Assistance OOB with selected safe patient handling equipment/Communicate Risk of Fall with Harm to all staff/Discuss with provider need for PT consult/Monitor gait and stability/Provide patient with walking aids - walker, cane, crutches/Reinforce activity limits and safety measures with patient and family/Tailored Fall Risk Interventions/Visual Cue: Yellow wristband and red socks/Bed in lowest position, wheels locked, appropriate side rails in place/Call bell, personal items and telephone in reach/Instruct patient to call for assistance before getting out of bed or chair/Non-slip footwear when patient is out of bed/[place] to call system/Physically safe environment - no spills, clutter or unnecessary equipment/Purposeful Proactive Rounding/Room/bathroom lighting operational, light cord in reach

## 2024-01-10 NOTE — PROGRESS NOTE ADULT - ASSESSMENT
78 year old female with PMH melanoma (vaginal), HTN, constipation, presents with waxing and waning abdominal pain and pain after eating for months, recently the abdominal pain acutely worsened in the last 3 days and patient presented to ED. Pain is reported to occur about an hour after eating, patient has also lost some weight per her family because her clothes are fitting looser. Exam with soft NTND abdomen. Labs with lactate 3.3->2.5 after 1L bolus. 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 Vascular Surgery consulted for SMA stenosis, history and exam concerning for possible chronic mesenteric ischemia.    Plan:  - No acute intervention  - patient should start on ASA and statin  - mesenteric duplex: normal  - lactate downtrending  - serial abdominal exams  - recommend medicine admission  - oncology consult iso vaginal melanoma undergoing chemo/immunotherapy  - pain control prn      Vascular 3528   78 year old female with PMH melanoma (vaginal), HTN, constipation, presents with waxing and waning abdominal pain and pain after eating for months, recently the abdominal pain acutely worsened in the last 3 days and patient presented to ED. Pain is reported to occur about an hour after eating, patient has also lost some weight per her family because her clothes are fitting looser. Exam with soft NTND abdomen. Labs with lactate 3.3->2.5 after 1L bolus. 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 Vascular Surgery consulted for SMA stenosis, history and exam concerning for possible chronic mesenteric ischemia.    Plan:  - No acute intervention  - patient should start on ASA and statin  - mesenteric duplex: normal  - lactate downtrending  - serial abdominal exams  - recommend medicine admission  - oncology consult iso vaginal melanoma undergoing chemo/immunotherapy  - pain control prn      Vascular 3711

## 2024-01-10 NOTE — PATIENT PROFILE ADULT - FALL HARM RISK - FACTORS NURSING JUDGEMENT
Healthy 31 yo M here for assessment of R heel pain today -- patient did boxing and wrestling this weekend with some extra walking yesterday. Today he woke up around 0230 with severe pain to heel and arch. No swelling, bruising. No calf pain, swelling. Yes

## 2024-01-10 NOTE — PROGRESS NOTE ADULT - ATTENDING COMMENTS
Patient with vaginal melanoma, ?metastatic disease, on chemotherapy p/w LUQ pain and diarrhea. CTA shows patent SMA, celiac, KARLA with atherosclerotic changes at origin of SMA and distal SMA narrowing. Mesenteric duplex WNL. Exam unremarkable.   -Recommend GI/GS workup  -Heme/onc consult  -Trend WBC, lactate (WNL)  -Will follow

## 2024-01-10 NOTE — PATIENT PROFILE ADULT - NSPROGENSOURCEINFO_GEN_A_NUR
Diabetes Medications; PROTOCOL FAILED. PLEASE ADVISE ON REFILL. THANKS.   Protocol Criteria:  · Appointment scheduled in the past 6 months or the next 3 months  · A1C < 7.5 in the past 6 months  · Creatinine in the past 12 months  · Creatinine result < 1.5
patient

## 2024-01-10 NOTE — PROGRESS NOTE ADULT - ASSESSMENT
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  Patient also had an episode of diarrhea, is not taking any antibiotics.  Denying any chest pain, shortness of breath, nausea, vomiting.  No fevers no chills.  Does not take any AC.    abdominal pain  possible chronic mesenteric ischemia.  - US doppler mesentric is normal  - ASA  -  lipitor  - GI consult    UTI  - c/w ceftriaxone  - follow up cultures    vaginal melanoma  - follow up with oncology as out pt    HTN  - c/w valsartam    dvt px  - sq hreparin

## 2024-01-11 PROCEDURE — 99232 SBSQ HOSP IP/OBS MODERATE 35: CPT

## 2024-01-11 PROCEDURE — 99223 1ST HOSP IP/OBS HIGH 75: CPT | Mod: GC

## 2024-01-11 RX ORDER — ACETAMINOPHEN 500 MG
1000 TABLET ORAL ONCE
Refills: 0 | Status: COMPLETED | OUTPATIENT
Start: 2024-01-11 | End: 2024-01-11

## 2024-01-11 RX ADMIN — HEPARIN SODIUM 5000 UNIT(S): 5000 INJECTION INTRAVENOUS; SUBCUTANEOUS at 21:30

## 2024-01-11 RX ADMIN — SENNA PLUS 2 TABLET(S): 8.6 TABLET ORAL at 21:30

## 2024-01-11 RX ADMIN — POLYETHYLENE GLYCOL 3350 17 GRAM(S): 17 POWDER, FOR SOLUTION ORAL at 12:02

## 2024-01-11 RX ADMIN — HEPARIN SODIUM 5000 UNIT(S): 5000 INJECTION INTRAVENOUS; SUBCUTANEOUS at 05:19

## 2024-01-11 RX ADMIN — Medication 400 MILLIGRAM(S): at 17:19

## 2024-01-11 RX ADMIN — Medication 1000 MILLIGRAM(S): at 17:49

## 2024-01-11 RX ADMIN — ATORVASTATIN CALCIUM 40 MILLIGRAM(S): 80 TABLET, FILM COATED ORAL at 21:30

## 2024-01-11 RX ADMIN — CEFTRIAXONE 100 MILLIGRAM(S): 500 INJECTION, POWDER, FOR SOLUTION INTRAMUSCULAR; INTRAVENOUS at 21:31

## 2024-01-11 RX ADMIN — HEPARIN SODIUM 5000 UNIT(S): 5000 INJECTION INTRAVENOUS; SUBCUTANEOUS at 15:17

## 2024-01-11 RX ADMIN — VALSARTAN 160 MILLIGRAM(S): 80 TABLET ORAL at 05:20

## 2024-01-11 NOTE — CONSULT NOTE ADULT - ASSESSMENT
78 year old female with history of hypertension and melanoma of the vagina on chemotherapy presenting with one month of progressively worsening abdominal pain, characterized by epigastric, RUQ, and periumbilical pain.  Pain is "always there" however is much worse after eating meals.  Denies NSAID use.  She endorses one episode of "diarrhea" upon presentation however no chronic issues, and otherwise, patient denies fevers, chills, weight loss, dysphagia, odynophagia, early satiety, poor oral intake, nausea, vomiting, melena, hematemesis, hematochezia, or prior endoscopy.    # Abdominal pain  # Focal SMA narrowing on CTA  DDx remains broad and includes mesenteric ischemia, esophagitis, peptic ulcer disease, erosive gastropathy, gastroparesis, or malignancy.    Recommendations:  -trend vitals, CBC, and monitor for clinical signs of bleeding  -maintain active type and screen  -transfusion goal to maintain hemoglobin >/= 7.0 and platelets >/= 50  -avoid NSAIDs  -PPI IV BID  -appreciate vascular surgery recommendations regarding focal sma narrowing on CTA which very well could represent a nidus for mesenteric ischemia  -to complete workup, please keep NPO for tentative EGD tomorrow    Note incomplete until finalized by attending signature/attestation.    Raymond Espinosa  GI/Hepatology Fellow    MONDAY-FRIDAY 8AM-5PM:  Pager# [phone] ([facility]) or [phone] ([facility])    NON-URGENT CONSULTS:  Please email [email or web address] OR [email or web address]  AT NIGHT AND ON WEEKENDS:  Contact on-call GI fellow via answering service ([phone]) from 5pm-8am and on weekends/holidays 78 year old female with history of hypertension and melanoma of the vagina on chemotherapy presenting with one month of progressively worsening abdominal pain, characterized by epigastric, RUQ, and periumbilical pain.  Pain is "always there" however is much worse after eating meals.  Denies NSAID use.  She endorses one episode of "diarrhea" upon presentation however no chronic issues, and otherwise, patient denies fevers, chills, weight loss, dysphagia, odynophagia, early satiety, poor oral intake, nausea, vomiting, melena, hematemesis, hematochezia, or prior endoscopy.    # Abdominal pain  # Focal SMA narrowing on CTA  DDx remains broad and includes mesenteric ischemia, esophagitis, peptic ulcer disease, erosive gastropathy, gastroparesis, or malignancy.    Recommendations:  -trend vitals, CBC, and monitor for clinical signs of bleeding  -maintain active type and screen  -transfusion goal to maintain hemoglobin >/= 7.0 and platelets >/= 50  -avoid NSAIDs  -PPI IV BID  -appreciate vascular surgery recommendations regarding focal sma narrowing on CTA which very well could represent a nidus for mesenteric ischemia  -to complete workup, please keep NPO for tentative EGD tomorrow    Note incomplete until finalized by attending signature/attestation.    Raymond Espinosa  GI/Hepatology Fellow    MONDAY-FRIDAY 8AM-5PM:  Pager# [phone] ([facility]) or [phone] ([facility])    NON-URGENT CONSULTS:  Please email [email or web address] OR [email or web address]  AT NIGHT AND ON WEEKENDS:  Contact on-call GI fellow via answering service ([phone]) from 5pm-8am and on weekends/holidays

## 2024-01-11 NOTE — CONSULT NOTE ADULT - ATTENDING COMMENTS
Patient with vaginal melanoma, ?metastatic disease, on chemotherapy p/w LUQ pain and diarrhea. CTA shows patent SMA, celiac, KARLA with atherosclerotic changes at origin of SMA and distal SMA narrowing. Mesenteric duplex WNL.   -Recommend GI/GS workup  -Heme/onc consult  -Trend WBC, lactate  -Will follow
Agree with above. Patient has had mesenteric Doppler's done, showing no significant stenosis. Vascular surgery f/u appreciated, no intervention as doppler's are unremarkable. Unclear etiology of patient's pain, she describes it as right flank to lower abdomen and epigastrium, but on exam is only epigastrium. Will offer EGD to rule out luminal causes.

## 2024-01-11 NOTE — CONSULT NOTE ADULT - SUBJECTIVE AND OBJECTIVE BOX
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    Patient presents with one month of progressively worsening abdominal pain, characterized by epigastric, RUQ, and periumbilical pain.  Pain is "always there" however is much worse after eating meals.  Denies NSAID use.  She endorses one episode of "diarrhea" upon presentation however no chronic issues, and otherwise, patient denies fevers, chills, weight loss, dysphagia, odynophagia, early satiety, poor oral intake, nausea, vomiting, melena, hematemesis, hematochezia, or prior endoscopy.    ROS:   General:  No fevers, chills, night sweats, fatigue  Eyes:  Good vision, no reported pain  ENT:  No sore throat, pain, runny nose  CV:  No pain, palpitations  Pulm:  No dyspnea, cough  GI:  See HPI, otherwise negative  :  No  incontinence, nocturia  Muscle:  No pain, weakness  Neuro:  No memory problems  Psych:  No insomnia, mood problems, depression  Endocrine:  No polyuria, polydipsia, cold/heat intolerance  Heme:  No petechiae, ecchymosis, easy bruisability  Skin:  No rash    PMHX/PSHX:    Hypertension    No significant past surgical history      Allergies:  No Known Allergies      Home Medications: reviewed  Hospital Medications:  atorvastatin 40 milliGRAM(s) Oral at bedtime  cefTRIAXone   IVPB 1000 milliGRAM(s) IV Intermittent every 24 hours  heparin   Injectable 5000 Unit(s) SubCutaneous every 8 hours  polyethylene glycol 3350 17 Gram(s) Oral daily  senna 2 Tablet(s) Oral at bedtime  valsartan 160 milliGRAM(s) Oral daily      Social History:   Tobacco: denies  Alcohol: denies  Recreational drugs: denies    Family history:      Denies family history of colon cancer/polyps, stomach cancer/polyps, pancreatic cancer/masses, liver cancer/disease, ovarian cancer and endometrial cancer.    PHYSICAL EXAM:   Vital Signs:  Vital Signs Last 24 Hrs  T(C): 37.4 (11 Jan 2024 11:29), Max: 37.4 (11 Jan 2024 11:29)  T(F): 99.3 (11 Jan 2024 11:29), Max: 99.3 (11 Jan 2024 11:29)  HR: 64 (11 Jan 2024 11:29) (57 - 67)  BP: 123/75 (11 Jan 2024 11:29) (123/75 - 169/68)  BP(mean): --  RR: 18 (11 Jan 2024 11:29) (16 - 18)  SpO2: 94% (11 Jan 2024 11:29) (92% - 96%)    Parameters below as of 11 Jan 2024 11:29  Patient On (Oxygen Delivery Method): room air      Daily     Daily     GENERAL: no acute distress  NEURO: alert  HEENT: NCAT, no conjunctival pallor appreciated  CHEST: no respiratory distress, no accessory muscle use  CARDIAC: regular rate, +S1/S2  ABDOMEN: soft, nontender, no rebound or guarding  EXTREMITIES: warm, well perfused  SKIN: no lesions noted    LABS: reviewed                        13.1   9.18  )-----------( 320      ( 10 Phong 2024 07:20 )             39.7     01-10    138  |  101  |  23  ----------------------------<  118<H>  4.0   |  26  |  0.59    Ca    8.9      10 Phong 2024 07:20  Phos  4.0     01-10  Mg     2.4     01-10          Culture - Urine (collected 09 Jan 2024 09:49)  Source: Clean Catch Clean Catch (Midstream)  Preliminary Report (11 Jan 2024 10:37):    >100,000 CFU/ml Escherichia coli    <10,000 CFU/ml Normal Urogenital apyrl present        Diagnostic Studies: see sunrise for full report         HPI:  KARTHIKEYAN ALCOCER is a 78 year old female with history of hypertension and 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    Patient presents with one month of progressively worsening abdominal pain, characterized by epigastric, RUQ, and periumbilical pain.  Pain is "always there" however is much worse after eating meals.  Denies NSAID use.  She endorses one episode of "diarrhea" upon presentation however no chronic issues, and otherwise, patient denies fevers, chills, weight loss, dysphagia, odynophagia, early satiety, poor oral intake, nausea, vomiting, melena, hematemesis, hematochezia, or prior endoscopy.    ROS:   General:  No fevers, chills, night sweats, fatigue  Eyes:  Good vision, no reported pain  ENT:  No sore throat, pain, runny nose  CV:  No pain, palpitations  Pulm:  No dyspnea, cough  GI:  See HPI, otherwise negative  :  No  incontinence, nocturia  Muscle:  No pain, weakness  Neuro:  No memory problems  Psych:  No insomnia, mood problems, depression  Endocrine:  No polyuria, polydipsia, cold/heat intolerance  Heme:  No petechiae, ecchymosis, easy bruisability  Skin:  No rash    PMHX/PSHX:    Hypertension    No significant past surgical history      Allergies:  No Known Allergies      Home Medications: reviewed  Hospital Medications:  atorvastatin 40 milliGRAM(s) Oral at bedtime  cefTRIAXone   IVPB 1000 milliGRAM(s) IV Intermittent every 24 hours  heparin   Injectable 5000 Unit(s) SubCutaneous every 8 hours  polyethylene glycol 3350 17 Gram(s) Oral daily  senna 2 Tablet(s) Oral at bedtime  valsartan 160 milliGRAM(s) Oral daily      Social History:   Tobacco: denies  Alcohol: denies  Recreational drugs: denies    Family history:      Denies family history of colon cancer/polyps, stomach cancer/polyps, pancreatic cancer/masses, liver cancer/disease, ovarian cancer and endometrial cancer.    PHYSICAL EXAM:   Vital Signs:  Vital Signs Last 24 Hrs  T(C): 37.4 (11 Jan 2024 11:29), Max: 37.4 (11 Jan 2024 11:29)  T(F): 99.3 (11 Jan 2024 11:29), Max: 99.3 (11 Jan 2024 11:29)  HR: 64 (11 Jan 2024 11:29) (57 - 67)  BP: 123/75 (11 Jan 2024 11:29) (123/75 - 169/68)  BP(mean): --  RR: 18 (11 Jan 2024 11:29) (16 - 18)  SpO2: 94% (11 Jan 2024 11:29) (92% - 96%)    Parameters below as of 11 Jan 2024 11:29  Patient On (Oxygen Delivery Method): room air      Daily     Daily     GENERAL: no acute distress  NEURO: alert  HEENT: NCAT, no conjunctival pallor appreciated  CHEST: no respiratory distress, no accessory muscle use  CARDIAC: regular rate, +S1/S2  ABDOMEN: soft, nontender, no rebound or guarding  EXTREMITIES: warm, well perfused  SKIN: no lesions noted    LABS: reviewed                        13.1   9.18  )-----------( 320      ( 10 Phong 2024 07:20 )             39.7     01-10    138  |  101  |  23  ----------------------------<  118<H>  4.0   |  26  |  0.59    Ca    8.9      10 Phong 2024 07:20  Phos  4.0     01-10  Mg     2.4     01-10          Culture - Urine (collected 09 Jan 2024 09:49)  Source: Clean Catch Clean Catch (Midstream)  Preliminary Report (11 Jan 2024 10:37):    >100,000 CFU/ml Escherichia coli    <10,000 CFU/ml Normal Urogenital apryl present        Diagnostic Studies: see sunrise for full report         HPI:  KARTHIKEYAN ALCOCER is a 78 year old female with history of hypertension and 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    Patient presents with one month of progressively worsening abdominal pain, characterized by epigastric, RUQ, and periumbilical pain.  Pain is "always there" however is much worse after eating meals.  Denies NSAID use.  She endorses one episode of "diarrhea" upon presentation however no chronic issues, and otherwise, patient denies fevers, chills, weight loss, dysphagia, odynophagia, early satiety, poor oral intake, nausea, vomiting, melena, hematemesis, hematochezia, or prior endoscopy. She is on immunotherapy nivolumab/relatlimab for the melanoma.    ROS:   General:  No fevers, chills, night sweats, fatigue  Eyes:  Good vision, no reported pain  ENT:  No sore throat, pain, runny nose  CV:  No pain, palpitations  Pulm:  No dyspnea, cough  GI:  See HPI, otherwise negative  :  No  incontinence, nocturia  Muscle:  No pain, weakness  Neuro:  No memory problems  Psych:  No insomnia, mood problems, depression  Endocrine:  No polyuria, polydipsia, cold/heat intolerance  Heme:  No petechiae, ecchymosis, easy bruisability  Skin:  No rash    PMHX/PSHX:    Hypertension    No significant past surgical history      Allergies:  No Known Allergies      Home Medications: reviewed  Hospital Medications:  atorvastatin 40 milliGRAM(s) Oral at bedtime  cefTRIAXone   IVPB 1000 milliGRAM(s) IV Intermittent every 24 hours  heparin   Injectable 5000 Unit(s) SubCutaneous every 8 hours  polyethylene glycol 3350 17 Gram(s) Oral daily  senna 2 Tablet(s) Oral at bedtime  valsartan 160 milliGRAM(s) Oral daily      Social History:   Tobacco: denies  Alcohol: denies  Recreational drugs: denies    Family history:      Denies family history of colon cancer/polyps, stomach cancer/polyps, pancreatic cancer/masses, liver cancer/disease, ovarian cancer and endometrial cancer.    PHYSICAL EXAM:   Vital Signs:  Vital Signs Last 24 Hrs  T(C): 37.4 (11 Jan 2024 11:29), Max: 37.4 (11 Jan 2024 11:29)  T(F): 99.3 (11 Jan 2024 11:29), Max: 99.3 (11 Jan 2024 11:29)  HR: 64 (11 Jan 2024 11:29) (57 - 67)  BP: 123/75 (11 Jan 2024 11:29) (123/75 - 169/68)  BP(mean): --  RR: 18 (11 Jan 2024 11:29) (16 - 18)  SpO2: 94% (11 Jan 2024 11:29) (92% - 96%)    Parameters below as of 11 Jan 2024 11:29  Patient On (Oxygen Delivery Method): room air      Daily     Daily     GENERAL: no acute distress  NEURO: alert  HEENT: NCAT, no conjunctival pallor appreciated  CHEST: no respiratory distress, no accessory muscle use  CARDIAC: regular rate, +S1/S2  ABDOMEN: soft, tender in epigastrium, no rebound or guarding  EXTREMITIES: warm, well perfused  SKIN: no lesions noted    LABS: reviewed                        13.1   9.18  )-----------( 320      ( 10 Phong 2024 07:20 )             39.7     01-10    138  |  101  |  23  ----------------------------<  118<H>  4.0   |  26  |  0.59    Ca    8.9      10 Phong 2024 07:20  Phos  4.0     01-10  Mg     2.4     01-10          Culture - Urine (collected 09 Jan 2024 09:49)  Source: Clean Catch Clean Catch (Midstream)  Preliminary Report (11 Jan 2024 10:37):    >100,000 CFU/ml Escherichia coli    <10,000 CFU/ml Normal Urogenital apryl present      < from: CT Angio Abdomen and Pelvis w/ IV Cont (01.09.24 @ 09:15) >    ACC: [id number] EXAM:  CT ANGIO ABD PELV (W)AW IC   ORDERED BY: OTTO LANE     *** ADDENDUM # 1 ***    Addendum: Impression should also include:  Focal narrowing proximal SMA without evidence bowel ischemia    --- End of Report ---    *** END OF ADDENDUM # 1 ***      PROCEDURE DATE:  01/09/2024          INTERPRETATION:  CLINICAL INFORMATION: 78-year-old female with 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 CT scan 05/30/2023    CONTRAST/COMPLICATIONS:  IV Contrast: Omnipaque 350  90 cc administered   10 cc discarded  Oral Contrast: NONE  Complications: None reported at time of study completion    PROCEDURE:  CT Angiography of the Abdomen and Pelvis was performed.  Arterial and venous phases were acquired.  Sagittal and coronal reformats were performed as well as 3D (MIP)   reconstructions.    FINDINGS:  LOWER CHEST: Coronary artery calcification.    LIVER: Within normal limits.  BILE DUCTS: Normal caliber.  GALLBLADDER: Within normal limits.  SPLEEN: Within normal limits.  PANCREAS: Within normal limits.  ADRENALS: 2.3 x 1.9 cm indeterminate left adrenal nodule unchanged from   05/30/2023  KIDNEYS/URETERS: Bilateral parapelvic cysts.    BLADDER: Soft tissue mass 2.6 x 2.2 x 1.4 cm (605:73 and 304:114) at   anterior bladder base corresponding to previously demonstrated neoplasm   on MRI  REPRODUCTIVE ORGANS: Uterus and adnexa within normal limits.    BOWEL: No bowel obstruction. Appendix normal. No signs of bowel ischemia.   Colonic diverticula.  PERITONEUM: No ascites.  VESSELS: Severe focal narrowing proximal SMA (301:50). Otherwise patent   SMA. Patent celiac axis and branches, bilateral renal arteries, KARLA,   bilateral iliac and visualized femoral arteries.  RETROPERITONEUM/LYMPH NODES: Bilateral mildly enlarged inguinal lymph   nodes similar to 11/24/2023, 1.4 x 1.1 cm on the right and 1.7 x 1.2 cm   and the left  ABDOMINAL WALL: Within normal limits.  BONES: Within normal limits.    IMPRESSION:  Cause of abdominal pain not identified.  Unchanged left adrenal nodule.  Soft tissue mass anterior bladder base extending to the urethra similar   to prior MRI 11/24/2023.        --- End of Report ---    ***Please see the addendum at the top ofthis report. It may contain   additional important information or changes.****        KLAUS GAYTAN MD; Attending Radiologist  This document has been electronically signed. Jan 9 2024 10:14AM  1st Addendum: KLAUS GAYTAN MD; Attending Radiologist  The first addendum was electronically signed on: Jan 9 2024 10:48AM.    < end of copied text >        < from: US Doppler Mesenteric (01.09.24 @ 14:13) >  INTERPRETATION:  Clinical information: 78-year-old with concern for SMA   stenosis on previous CT scan. Assess for mesenteric artery stenosis.    Duplex and color flow Doppler evaluation of the abdominal aorta and   mesenteric arteries was performed.    Comparison is made with prior CT angiogram from 1/9/2024.    The abdominal aorta is normal in size without evidence of focal aneurysm.   Mild atherosclerotic changes are seen. The peak systolic velocity is 90   cm/s.    The peak systolic velocity in the celiac artery is 144 cm/s. The peak   systolic velocity in the SMA is 137 cm/s in the proximal segment and 159   cm/s distally. The peak systolic velocity in the inferior mesenteric   artery is 40 cm/s.    IMPRESSION: No evidence of abdominal aortic aneurysm.    The mesenteric arteries are patent without evidence of significant   stenosis.    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    Patient presents with one month of progressively worsening abdominal pain, characterized by epigastric, RUQ, and periumbilical pain.  Pain is "always there" however is much worse after eating meals.  Denies NSAID use.  She endorses one episode of "diarrhea" upon presentation however no chronic issues, and otherwise, patient denies fevers, chills, weight loss, dysphagia, odynophagia, early satiety, poor oral intake, nausea, vomiting, melena, hematemesis, hematochezia, or prior endoscopy. She is on immunotherapy nivolumab/relatlimab for the melanoma.    ROS:   General:  No fevers, chills, night sweats, fatigue  Eyes:  Good vision, no reported pain  ENT:  No sore throat, pain, runny nose  CV:  No pain, palpitations  Pulm:  No dyspnea, cough  GI:  See HPI, otherwise negative  :  No  incontinence, nocturia  Muscle:  No pain, weakness  Neuro:  No memory problems  Psych:  No insomnia, mood problems, depression  Endocrine:  No polyuria, polydipsia, cold/heat intolerance  Heme:  No petechiae, ecchymosis, easy bruisability  Skin:  No rash    PMHX/PSHX:    Hypertension    No significant past surgical history      Allergies:  No Known Allergies      Home Medications: reviewed  Hospital Medications:  atorvastatin 40 milliGRAM(s) Oral at bedtime  cefTRIAXone   IVPB 1000 milliGRAM(s) IV Intermittent every 24 hours  heparin   Injectable 5000 Unit(s) SubCutaneous every 8 hours  polyethylene glycol 3350 17 Gram(s) Oral daily  senna 2 Tablet(s) Oral at bedtime  valsartan 160 milliGRAM(s) Oral daily      Social History:   Tobacco: denies  Alcohol: denies  Recreational drugs: denies    Family history:      Denies family history of colon cancer/polyps, stomach cancer/polyps, pancreatic cancer/masses, liver cancer/disease, ovarian cancer and endometrial cancer.    PHYSICAL EXAM:   Vital Signs:  Vital Signs Last 24 Hrs  T(C): 37.4 (11 Jan 2024 11:29), Max: 37.4 (11 Jan 2024 11:29)  T(F): 99.3 (11 Jan 2024 11:29), Max: 99.3 (11 Jan 2024 11:29)  HR: 64 (11 Jan 2024 11:29) (57 - 67)  BP: 123/75 (11 Jan 2024 11:29) (123/75 - 169/68)  BP(mean): --  RR: 18 (11 Jan 2024 11:29) (16 - 18)  SpO2: 94% (11 Jan 2024 11:29) (92% - 96%)    Parameters below as of 11 Jan 2024 11:29  Patient On (Oxygen Delivery Method): room air      Daily     Daily     GENERAL: no acute distress  NEURO: alert  HEENT: NCAT, no conjunctival pallor appreciated  CHEST: no respiratory distress, no accessory muscle use  CARDIAC: regular rate, +S1/S2  ABDOMEN: soft, tender in epigastrium, no rebound or guarding  EXTREMITIES: warm, well perfused  SKIN: no lesions noted    LABS: reviewed                        13.1   9.18  )-----------( 320      ( 10 Phong 2024 07:20 )             39.7     01-10    138  |  101  |  23  ----------------------------<  118<H>  4.0   |  26  |  0.59    Ca    8.9      10 Phong 2024 07:20  Phos  4.0     01-10  Mg     2.4     01-10          Culture - Urine (collected 09 Jan 2024 09:49)  Source: Clean Catch Clean Catch (Midstream)  Preliminary Report (11 Jan 2024 10:37):    >100,000 CFU/ml Escherichia coli    <10,000 CFU/ml Normal Urogenital apryl present      < from: CT Angio Abdomen and Pelvis w/ IV Cont (01.09.24 @ 09:15) >    ACC: [id number] EXAM:  CT ANGIO ABD PELV (W)AW IC   ORDERED BY: OTTO LANE     *** ADDENDUM # 1 ***    Addendum: Impression should also include:  Focal narrowing proximal SMA without evidence bowel ischemia    --- End of Report ---    *** END OF ADDENDUM # 1 ***      PROCEDURE DATE:  01/09/2024          INTERPRETATION:  CLINICAL INFORMATION: 78-year-old female with 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 CT scan 05/30/2023    CONTRAST/COMPLICATIONS:  IV Contrast: Omnipaque 350  90 cc administered   10 cc discarded  Oral Contrast: NONE  Complications: None reported at time of study completion    PROCEDURE:  CT Angiography of the Abdomen and Pelvis was performed.  Arterial and venous phases were acquired.  Sagittal and coronal reformats were performed as well as 3D (MIP)   reconstructions.    FINDINGS:  LOWER CHEST: Coronary artery calcification.    LIVER: Within normal limits.  BILE DUCTS: Normal caliber.  GALLBLADDER: Within normal limits.  SPLEEN: Within normal limits.  PANCREAS: Within normal limits.  ADRENALS: 2.3 x 1.9 cm indeterminate left adrenal nodule unchanged from   05/30/2023  KIDNEYS/URETERS: Bilateral parapelvic cysts.    BLADDER: Soft tissue mass 2.6 x 2.2 x 1.4 cm (605:73 and 304:114) at   anterior bladder base corresponding to previously demonstrated neoplasm   on MRI  REPRODUCTIVE ORGANS: Uterus and adnexa within normal limits.    BOWEL: No bowel obstruction. Appendix normal. No signs of bowel ischemia.   Colonic diverticula.  PERITONEUM: No ascites.  VESSELS: Severe focal narrowing proximal SMA (301:50). Otherwise patent   SMA. Patent celiac axis and branches, bilateral renal arteries, KARLA,   bilateral iliac and visualized femoral arteries.  RETROPERITONEUM/LYMPH NODES: Bilateral mildly enlarged inguinal lymph   nodes similar to 11/24/2023, 1.4 x 1.1 cm on the right and 1.7 x 1.2 cm   and the left  ABDOMINAL WALL: Within normal limits.  BONES: Within normal limits.    IMPRESSION:  Cause of abdominal pain not identified.  Unchanged left adrenal nodule.  Soft tissue mass anterior bladder base extending to the urethra similar   to prior MRI 11/24/2023.        --- End of Report ---    ***Please see the addendum at the top ofthis report. It may contain   additional important information or changes.****        KLAUS GAYTAN MD; Attending Radiologist  This document has been electronically signed. Jan 9 2024 10:14AM  1st Addendum: KLAUS GAYTAN MD; Attending Radiologist  The first addendum was electronically signed on: Jan 9 2024 10:48AM.    < end of copied text >        < from: US Doppler Mesenteric (01.09.24 @ 14:13) >  INTERPRETATION:  Clinical information: 78-year-old with concern for SMA   stenosis on previous CT scan. Assess for mesenteric artery stenosis.    Duplex and color flow Doppler evaluation of the abdominal aorta and   mesenteric arteries was performed.    Comparison is made with prior CT angiogram from 1/9/2024.    The abdominal aorta is normal in size without evidence of focal aneurysm.   Mild atherosclerotic changes are seen. The peak systolic velocity is 90   cm/s.    The peak systolic velocity in the celiac artery is 144 cm/s. The peak   systolic velocity in the SMA is 137 cm/s in the proximal segment and 159   cm/s distally. The peak systolic velocity in the inferior mesenteric   artery is 40 cm/s.    IMPRESSION: No evidence of abdominal aortic aneurysm.    The mesenteric arteries are patent without evidence of significant   stenosis.    < end of copied text >    Diagnostic Studies: see sunrise for full report

## 2024-01-11 NOTE — PROGRESS NOTE ADULT - ASSESSMENT
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  Patient also had an episode of diarrhea, is not taking any antibiotics.  Denying any chest pain, shortness of breath, nausea, vomiting.  No fevers no chills.  Does not take any AC.    abdominal pain  possible chronic mesenteric ischemia.  - US doppler mesentry is normal  - ASA  -  lipitor  - GI consult    UTI  - c/w ceftriaxone  - follow up cultures    vaginal melanoma  - follow up with oncology as out pt    HTN  - c/w valsartam    dvt px  - sq hreparin

## 2024-01-11 NOTE — PROGRESS NOTE ADULT - ASSESSMENT
78 year old female with PMH melanoma (vaginal), HTN, constipation, presents with waxing and waning abdominal pain and pain after eating for months, recently the abdominal pain acutely worsened in the last 3 days and patient presented to ED. Pain is reported to occur about an hour after eating, patient has also lost some weight per her family because her clothes are fitting looser. Exam with soft NTND abdomen. Labs with lactate 3.3->2.5 after 1L bolus. 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 Vascular Surgery consulted for SMA stenosis, history and exam concerning for possible chronic mesenteric ischemia.    Plan:  - No acute intervention  - patient should start on ASA and statin  - mesenteric duplex: normal  - lactate downtrending  - serial abdominal exams  - F/u GI recs  - pain control prn    Vascular Surgery [phone]   78 year old female with PMH melanoma (vaginal), HTN, constipation, presents with waxing and waning abdominal pain and pain after eating for months, recently the abdominal pain acutely worsened in the last 3 days and patient presented to ED. Pain is reported to occur about an hour after eating, patient has also lost some weight per her family because her clothes are fitting looser. Exam with soft NTND abdomen. Labs with lactate 3.3->2.5 after 1L bolus. 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 Vascular Surgery consulted for SMA stenosis, history and exam concerning for possible chronic mesenteric ischemia.    Plan:  - No acute intervention  - patient should start on ASA and statin  - mesenteric duplex: normal  - lactate downtrending  - serial abdominal exams  - F/u GI recs  - pain control prn    Vascular Surgery [phone]

## 2024-01-12 ENCOUNTER — RESULT REVIEW (OUTPATIENT)
Age: 79
End: 2024-01-12

## 2024-01-12 LAB
-  AMOXICILLIN/CLAVULANIC ACID: SIGNIFICANT CHANGE UP
-  AMOXICILLIN/CLAVULANIC ACID: SIGNIFICANT CHANGE UP
-  AMPICILLIN/SULBACTAM: SIGNIFICANT CHANGE UP
-  AMPICILLIN/SULBACTAM: SIGNIFICANT CHANGE UP
-  AMPICILLIN: SIGNIFICANT CHANGE UP
-  AMPICILLIN: SIGNIFICANT CHANGE UP
-  AZTREONAM: SIGNIFICANT CHANGE UP
-  AZTREONAM: SIGNIFICANT CHANGE UP
-  CEFAZOLIN: SIGNIFICANT CHANGE UP
-  CEFAZOLIN: SIGNIFICANT CHANGE UP
-  CEFEPIME: SIGNIFICANT CHANGE UP
-  CEFEPIME: SIGNIFICANT CHANGE UP
-  CEFOXITIN: SIGNIFICANT CHANGE UP
-  CEFOXITIN: SIGNIFICANT CHANGE UP
-  CEFTRIAXONE: SIGNIFICANT CHANGE UP
-  CEFTRIAXONE: SIGNIFICANT CHANGE UP
-  CEFUROXIME: SIGNIFICANT CHANGE UP
-  CEFUROXIME: SIGNIFICANT CHANGE UP
-  CIPROFLOXACIN: SIGNIFICANT CHANGE UP
-  CIPROFLOXACIN: SIGNIFICANT CHANGE UP
-  ERTAPENEM: SIGNIFICANT CHANGE UP
-  ERTAPENEM: SIGNIFICANT CHANGE UP
-  GENTAMICIN: SIGNIFICANT CHANGE UP
-  GENTAMICIN: SIGNIFICANT CHANGE UP
-  IMIPENEM: SIGNIFICANT CHANGE UP
-  IMIPENEM: SIGNIFICANT CHANGE UP
-  LEVOFLOXACIN: SIGNIFICANT CHANGE UP
-  LEVOFLOXACIN: SIGNIFICANT CHANGE UP
-  MEROPENEM: SIGNIFICANT CHANGE UP
-  MEROPENEM: SIGNIFICANT CHANGE UP
-  NITROFURANTOIN: SIGNIFICANT CHANGE UP
-  NITROFURANTOIN: SIGNIFICANT CHANGE UP
-  PIPERACILLIN/TAZOBACTAM: SIGNIFICANT CHANGE UP
-  PIPERACILLIN/TAZOBACTAM: SIGNIFICANT CHANGE UP
-  TOBRAMYCIN: SIGNIFICANT CHANGE UP
-  TOBRAMYCIN: SIGNIFICANT CHANGE UP
-  TRIMETHOPRIM/SULFAMETHOXAZOLE: SIGNIFICANT CHANGE UP
-  TRIMETHOPRIM/SULFAMETHOXAZOLE: SIGNIFICANT CHANGE UP
ANION GAP SERPL CALC-SCNC: 10 MMOL/L — SIGNIFICANT CHANGE UP (ref 5–17)
ANION GAP SERPL CALC-SCNC: 10 MMOL/L — SIGNIFICANT CHANGE UP (ref 5–17)
BUN SERPL-MCNC: 23 MG/DL — SIGNIFICANT CHANGE UP (ref 7–23)
BUN SERPL-MCNC: 23 MG/DL — SIGNIFICANT CHANGE UP (ref 7–23)
CALCIUM SERPL-MCNC: 8.9 MG/DL — SIGNIFICANT CHANGE UP (ref 8.4–10.5)
CALCIUM SERPL-MCNC: 8.9 MG/DL — SIGNIFICANT CHANGE UP (ref 8.4–10.5)
CHLORIDE SERPL-SCNC: 102 MMOL/L — SIGNIFICANT CHANGE UP (ref 96–108)
CHLORIDE SERPL-SCNC: 102 MMOL/L — SIGNIFICANT CHANGE UP (ref 96–108)
CO2 SERPL-SCNC: 25 MMOL/L — SIGNIFICANT CHANGE UP (ref 22–31)
CO2 SERPL-SCNC: 25 MMOL/L — SIGNIFICANT CHANGE UP (ref 22–31)
CREAT SERPL-MCNC: 0.59 MG/DL — SIGNIFICANT CHANGE UP (ref 0.5–1.3)
CREAT SERPL-MCNC: 0.59 MG/DL — SIGNIFICANT CHANGE UP (ref 0.5–1.3)
CULTURE RESULTS: ABNORMAL
CULTURE RESULTS: ABNORMAL
EGFR: 92 ML/MIN/1.73M2 — SIGNIFICANT CHANGE UP
EGFR: 92 ML/MIN/1.73M2 — SIGNIFICANT CHANGE UP
GLUCOSE SERPL-MCNC: 107 MG/DL — HIGH (ref 70–99)
GLUCOSE SERPL-MCNC: 107 MG/DL — HIGH (ref 70–99)
HCT VFR BLD CALC: 40.3 % — SIGNIFICANT CHANGE UP (ref 34.5–45)
HCT VFR BLD CALC: 40.3 % — SIGNIFICANT CHANGE UP (ref 34.5–45)
HGB BLD-MCNC: 13.2 G/DL — SIGNIFICANT CHANGE UP (ref 11.5–15.5)
HGB BLD-MCNC: 13.2 G/DL — SIGNIFICANT CHANGE UP (ref 11.5–15.5)
MAGNESIUM SERPL-MCNC: 2.3 MG/DL — SIGNIFICANT CHANGE UP (ref 1.6–2.6)
MAGNESIUM SERPL-MCNC: 2.3 MG/DL — SIGNIFICANT CHANGE UP (ref 1.6–2.6)
MCHC RBC-ENTMCNC: 30.4 PG — SIGNIFICANT CHANGE UP (ref 27–34)
MCHC RBC-ENTMCNC: 30.4 PG — SIGNIFICANT CHANGE UP (ref 27–34)
MCHC RBC-ENTMCNC: 32.8 GM/DL — SIGNIFICANT CHANGE UP (ref 32–36)
MCHC RBC-ENTMCNC: 32.8 GM/DL — SIGNIFICANT CHANGE UP (ref 32–36)
MCV RBC AUTO: 92.9 FL — SIGNIFICANT CHANGE UP (ref 80–100)
MCV RBC AUTO: 92.9 FL — SIGNIFICANT CHANGE UP (ref 80–100)
METHOD TYPE: SIGNIFICANT CHANGE UP
METHOD TYPE: SIGNIFICANT CHANGE UP
NRBC # BLD: 0 /100 WBCS — SIGNIFICANT CHANGE UP (ref 0–0)
NRBC # BLD: 0 /100 WBCS — SIGNIFICANT CHANGE UP (ref 0–0)
ORGANISM # SPEC MICROSCOPIC CNT: ABNORMAL
PHOSPHATE SERPL-MCNC: 3.6 MG/DL — SIGNIFICANT CHANGE UP (ref 2.5–4.5)
PHOSPHATE SERPL-MCNC: 3.6 MG/DL — SIGNIFICANT CHANGE UP (ref 2.5–4.5)
PLATELET # BLD AUTO: 291 K/UL — SIGNIFICANT CHANGE UP (ref 150–400)
PLATELET # BLD AUTO: 291 K/UL — SIGNIFICANT CHANGE UP (ref 150–400)
POTASSIUM SERPL-MCNC: 4.1 MMOL/L — SIGNIFICANT CHANGE UP (ref 3.5–5.3)
POTASSIUM SERPL-MCNC: 4.1 MMOL/L — SIGNIFICANT CHANGE UP (ref 3.5–5.3)
POTASSIUM SERPL-SCNC: 4.1 MMOL/L — SIGNIFICANT CHANGE UP (ref 3.5–5.3)
POTASSIUM SERPL-SCNC: 4.1 MMOL/L — SIGNIFICANT CHANGE UP (ref 3.5–5.3)
RBC # BLD: 4.34 M/UL — SIGNIFICANT CHANGE UP (ref 3.8–5.2)
RBC # BLD: 4.34 M/UL — SIGNIFICANT CHANGE UP (ref 3.8–5.2)
RBC # FLD: 14 % — SIGNIFICANT CHANGE UP (ref 10.3–14.5)
RBC # FLD: 14 % — SIGNIFICANT CHANGE UP (ref 10.3–14.5)
SODIUM SERPL-SCNC: 137 MMOL/L — SIGNIFICANT CHANGE UP (ref 135–145)
SODIUM SERPL-SCNC: 137 MMOL/L — SIGNIFICANT CHANGE UP (ref 135–145)
SPECIMEN SOURCE: SIGNIFICANT CHANGE UP
SPECIMEN SOURCE: SIGNIFICANT CHANGE UP
WBC # BLD: 7.52 K/UL — SIGNIFICANT CHANGE UP (ref 3.8–10.5)
WBC # BLD: 7.52 K/UL — SIGNIFICANT CHANGE UP (ref 3.8–10.5)
WBC # FLD AUTO: 7.52 K/UL — SIGNIFICANT CHANGE UP (ref 3.8–10.5)
WBC # FLD AUTO: 7.52 K/UL — SIGNIFICANT CHANGE UP (ref 3.8–10.5)

## 2024-01-12 PROCEDURE — 88305 TISSUE EXAM BY PATHOLOGIST: CPT | Mod: 26

## 2024-01-12 PROCEDURE — 43239 EGD BIOPSY SINGLE/MULTIPLE: CPT | Mod: GC

## 2024-01-12 RX ORDER — SODIUM CHLORIDE 9 MG/ML
500 INJECTION INTRAMUSCULAR; INTRAVENOUS; SUBCUTANEOUS
Refills: 0 | Status: DISCONTINUED | OUTPATIENT
Start: 2024-01-12 | End: 2024-01-13

## 2024-01-12 RX ADMIN — ATORVASTATIN CALCIUM 40 MILLIGRAM(S): 80 TABLET, FILM COATED ORAL at 21:39

## 2024-01-12 RX ADMIN — HEPARIN SODIUM 5000 UNIT(S): 5000 INJECTION INTRAVENOUS; SUBCUTANEOUS at 05:19

## 2024-01-12 RX ADMIN — HEPARIN SODIUM 5000 UNIT(S): 5000 INJECTION INTRAVENOUS; SUBCUTANEOUS at 13:07

## 2024-01-12 RX ADMIN — POLYETHYLENE GLYCOL 3350 17 GRAM(S): 17 POWDER, FOR SOLUTION ORAL at 13:00

## 2024-01-12 RX ADMIN — VALSARTAN 160 MILLIGRAM(S): 80 TABLET ORAL at 05:19

## 2024-01-12 RX ADMIN — SENNA PLUS 2 TABLET(S): 8.6 TABLET ORAL at 21:39

## 2024-01-12 RX ADMIN — HEPARIN SODIUM 5000 UNIT(S): 5000 INJECTION INTRAVENOUS; SUBCUTANEOUS at 21:39

## 2024-01-12 NOTE — PROGRESS NOTE ADULT - ASSESSMENT
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  Patient also had an episode of diarrhea, is not taking any antibiotics.  Denying any chest pain, shortness of breath, nausea, vomiting.  No fevers no chills.  Does not take any AC.    abdominal pain  possible chronic mesenteric ischemia.  - US doppler mesentry is normal  - ASA  -  lipitor  - GI consult  - EGD today    UTI  - completed ceftriaxone  - sensitive to e coli    vaginal melanoma  - follow up with oncology as out pt    HTN  - c/w valsartam    dvt px  - sq hreparin

## 2024-01-12 NOTE — PRE-ANESTHESIA EVALUATION ADULT - NSANTHPMHFT_GEN_ALL_CORE
Medical history and ROS reviewed  Persistent abdominal pain with occasional nausea, both better now  h/o HTN and vaginal melanoma  No known cardiopulmonary disease, no CP, no CHF symptoms

## 2024-01-12 NOTE — PRE PROCEDURE NOTE - PRE PROCEDURE EVALUATION
Attending Physician:    Dr Hernandez                       Procedure: EGD    Indication for Procedure: abdominal pain   ________________________________________________________  PAST MEDICAL & SURGICAL HISTORY:  Hypertension      No significant past surgical history        ALLERGIES:  No Known Allergies    HOME MEDICATIONS:  calcium-vitamin D 500 mg-5 mcg (200 intl units) oral tablet: 1 tab(s) orally once a day  valsartan 160 mg oral tablet: 1 tab(s) orally once a day    AICD/PPM: [ ] yes   [x ] no    PERTINENT LAB DATA:                        13.2   7.52  )-----------( 291      ( 12 Jan 2024 06:56 )             40.3     01-12    137  |  102  |  23  ----------------------------<  107<H>  4.1   |  25  |  0.59    Ca    8.9      12 Jan 2024 06:56  Phos  3.6     01-12  Mg     2.3     01-12                  PHYSICAL EXAMINATION:    Height (cm): 152.4  Weight (kg): 71.2  BMI (kg/m2): 30.7  BSA (m2): 1.68T(C): 36.2  HR: 55  BP: 186/77  RR: 22  SpO2: 92%    Constitutional: NAD    Neck:  No JVD  Respiratory: no resp distress   Cardiovascular: rrrr  Extremities: No peripheral edema  Neurological: A/O x 3, no focal deficits        COMMENTS:    The patient is a suitable candidate for the planned procedure unless box checked [ ]  No, explain:

## 2024-01-13 ENCOUNTER — TRANSCRIPTION ENCOUNTER (OUTPATIENT)
Age: 79
End: 2024-01-13

## 2024-01-13 VITALS
DIASTOLIC BLOOD PRESSURE: 75 MMHG | TEMPERATURE: 98 F | SYSTOLIC BLOOD PRESSURE: 146 MMHG | OXYGEN SATURATION: 95 % | RESPIRATION RATE: 18 BRPM | HEART RATE: 62 BPM

## 2024-01-13 PROCEDURE — 81001 URINALYSIS AUTO W/SCOPE: CPT

## 2024-01-13 PROCEDURE — 84443 ASSAY THYROID STIM HORMONE: CPT

## 2024-01-13 PROCEDURE — 82330 ASSAY OF CALCIUM: CPT

## 2024-01-13 PROCEDURE — 85014 HEMATOCRIT: CPT

## 2024-01-13 PROCEDURE — 82947 ASSAY GLUCOSE BLOOD QUANT: CPT

## 2024-01-13 PROCEDURE — 80061 LIPID PANEL: CPT

## 2024-01-13 PROCEDURE — 85027 COMPLETE CBC AUTOMATED: CPT

## 2024-01-13 PROCEDURE — 83690 ASSAY OF LIPASE: CPT

## 2024-01-13 PROCEDURE — 80053 COMPREHEN METABOLIC PANEL: CPT

## 2024-01-13 PROCEDURE — 71046 X-RAY EXAM CHEST 2 VIEWS: CPT

## 2024-01-13 PROCEDURE — 83036 HEMOGLOBIN GLYCOSYLATED A1C: CPT

## 2024-01-13 PROCEDURE — 83735 ASSAY OF MAGNESIUM: CPT

## 2024-01-13 PROCEDURE — 82746 ASSAY OF FOLIC ACID SERUM: CPT

## 2024-01-13 PROCEDURE — 84145 PROCALCITONIN (PCT): CPT

## 2024-01-13 PROCEDURE — 85025 COMPLETE CBC W/AUTO DIFF WBC: CPT

## 2024-01-13 PROCEDURE — 87086 URINE CULTURE/COLONY COUNT: CPT

## 2024-01-13 PROCEDURE — 96374 THER/PROPH/DIAG INJ IV PUSH: CPT

## 2024-01-13 PROCEDURE — 96375 TX/PRO/DX INJ NEW DRUG ADDON: CPT

## 2024-01-13 PROCEDURE — 84100 ASSAY OF PHOSPHORUS: CPT

## 2024-01-13 PROCEDURE — 82803 BLOOD GASES ANY COMBINATION: CPT

## 2024-01-13 PROCEDURE — 80048 BASIC METABOLIC PNL TOTAL CA: CPT

## 2024-01-13 PROCEDURE — 86901 BLOOD TYPING SEROLOGIC RH(D): CPT

## 2024-01-13 PROCEDURE — 88305 TISSUE EXAM BY PATHOLOGIST: CPT

## 2024-01-13 PROCEDURE — 84132 ASSAY OF SERUM POTASSIUM: CPT

## 2024-01-13 PROCEDURE — 74174 CTA ABD&PLVS W/CONTRAST: CPT | Mod: MA

## 2024-01-13 PROCEDURE — 82435 ASSAY OF BLOOD CHLORIDE: CPT

## 2024-01-13 PROCEDURE — 87186 SC STD MICRODIL/AGAR DIL: CPT

## 2024-01-13 PROCEDURE — 85018 HEMOGLOBIN: CPT

## 2024-01-13 PROCEDURE — 86850 RBC ANTIBODY SCREEN: CPT

## 2024-01-13 PROCEDURE — 36415 COLL VENOUS BLD VENIPUNCTURE: CPT

## 2024-01-13 PROCEDURE — 84295 ASSAY OF SERUM SODIUM: CPT

## 2024-01-13 PROCEDURE — 93975 VASCULAR STUDY: CPT

## 2024-01-13 PROCEDURE — 86900 BLOOD TYPING SEROLOGIC ABO: CPT

## 2024-01-13 PROCEDURE — 83605 ASSAY OF LACTIC ACID: CPT

## 2024-01-13 PROCEDURE — 82607 VITAMIN B-12: CPT

## 2024-01-13 PROCEDURE — 99285 EMERGENCY DEPT VISIT HI MDM: CPT

## 2024-01-13 RX ORDER — PANTOPRAZOLE SODIUM 20 MG/1
1 TABLET, DELAYED RELEASE ORAL
Qty: 30 | Refills: 0
Start: 2024-01-13 | End: 2024-02-11

## 2024-01-13 RX ORDER — PANTOPRAZOLE SODIUM 20 MG/1
40 TABLET, DELAYED RELEASE ORAL
Refills: 0 | Status: DISCONTINUED | OUTPATIENT
Start: 2024-01-13 | End: 2024-01-13

## 2024-01-13 RX ORDER — ATORVASTATIN CALCIUM 80 MG/1
1 TABLET, FILM COATED ORAL
Qty: 30 | Refills: 0
Start: 2024-01-13 | End: 2024-02-11

## 2024-01-13 RX ADMIN — VALSARTAN 160 MILLIGRAM(S): 80 TABLET ORAL at 05:33

## 2024-01-13 RX ADMIN — HEPARIN SODIUM 5000 UNIT(S): 5000 INJECTION INTRAVENOUS; SUBCUTANEOUS at 05:34

## 2024-01-13 RX ADMIN — POLYETHYLENE GLYCOL 3350 17 GRAM(S): 17 POWDER, FOR SOLUTION ORAL at 11:26

## 2024-01-13 NOTE — DISCHARGE NOTE PROVIDER - NSDCQMSTAIRS_GEN_ALL_CORE
Pt resting in bed; states feels a little drowsy; no other symptoms at present time     Emmanuel Zuñiga RN  04/10/21 [id number] No

## 2024-01-13 NOTE — DISCHARGE NOTE PROVIDER - NSDCMRMEDTOKEN_GEN_ALL_CORE_FT
atorvastatin 40 mg oral tablet: 1 tab(s) orally once a day (at bedtime) MDD: &#x60;1  calcium-vitamin D 500 mg-5 mcg (200 intl units) oral tablet: 1 tab(s) orally once a day  pantoprazole 40 mg oral delayed release tablet: 1 tab(s) orally once a day (before a meal) MDD: 1  valsartan 160 mg oral tablet: 1 tab(s) orally once a day

## 2024-01-13 NOTE — DISCHARGE NOTE NURSING/CASE MANAGEMENT/SOCIAL WORK - PATIENT PORTAL LINK FT
You can access the FollowMyHealth Patient Portal offered by [facility] by registering at the following website: [email or web address]. By joining [employer]’s FollowMyHealth portal, you will also be able to view your health information using other applications (apps) compatible with our system. You can access the FollowMyHealth Patient Portal offered by [facility] by registering at the following website: [email or web address]. By joining [email or web address]’s FollowMyHealth portal, you will also be able to view your health information using other applications (apps) compatible with our system.

## 2024-01-13 NOTE — DISCHARGE NOTE NURSING/CASE MANAGEMENT/SOCIAL WORK - NSDCPEFALRISK_GEN_ALL_CORE
For information on Fall & Injury Prevention, visit: [email or web address] OR  [email or web address] OR  https://www.cdc.gov/steadi/patient.html For information on Fall & Injury Prevention, visit: [email or web address] OR  [email or web address] OR  https://www.cdc.gov/steadi/patient.html

## 2024-01-13 NOTE — PROGRESS NOTE ADULT - ASSESSMENT
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  Patient also had an episode of diarrhea, is not taking any antibiotics.  Denying any chest pain, shortness of breath, nausea, vomiting.  No fevers no chills.  Does not take any AC.    abdominal pain  possible chronic mesenteric ischemia.  - US doppler mesentry is normal  - ASA  -  lipitor  - GI consult  - EGD done    UTI  - completed ceftriaxone  - sensitive to e coli    vaginal melanoma  - follow up with oncology as out pt    HTN  - c/w valsartam    dvt px  - sq hreparin    d/c home

## 2024-01-13 NOTE — PROGRESS NOTE ADULT - REASON FOR ADMISSION
abdominal pain after eating

## 2024-01-13 NOTE — DISCHARGE NOTE PROVIDER - HOSPITAL COURSE
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  Patient also had an episode of diarrhea, is not taking any antibiotics.  Denying any chest pain, shortness of breath, nausea, vomiting.  No fevers no chills.  Does not take any AC. (09 Jan 2024 18:11)    Hospital Course:      Important Medication Changes and Reason:    Active or Pending Issues Requiring Follow-up:    Advanced Directives:   [x ] Full code  [ ] DNR  [ ] Hospice    Discharge Diagnoses:

## 2024-01-13 NOTE — PROGRESS NOTE ADULT - PROVIDER SPECIALTY LIST ADULT
Internal Medicine
Vascular Surgery
Internal Medicine
Internal Medicine
Vascular Surgery
Internal Medicine

## 2024-01-13 NOTE — DISCHARGE NOTE PROVIDER - NSDCCPCAREPLAN_GEN_ALL_CORE_FT
PRINCIPAL DISCHARGE DIAGNOSIS  Diagnosis: Abdominal pain  Assessment and Plan of Treatment: s/p CT of abdomen and pelvis: negative findings.   Follow up with PMD in 1 week.

## 2024-01-13 NOTE — PROGRESS NOTE ADULT - SUBJECTIVE AND OBJECTIVE BOX
DATE OF SERVICE: 01-12-24 @ 11:33    Patient is a 78y old  Female who presents with a chief complaint of abdominal pain after eating (11 Jan 2024 15:23)      SUBJECTIVE / OVERNIGHT EVENTS:  No chest pain. No shortness of breath. No complaints. No events overnight.     MEDICATIONS  (STANDING):  atorvastatin 40 milliGRAM(s) Oral at bedtime  heparin   Injectable 5000 Unit(s) SubCutaneous every 8 hours  polyethylene glycol 3350 17 Gram(s) Oral daily  senna 2 Tablet(s) Oral at bedtime  sodium chloride 0.9%. 500 milliLiter(s) (30 mL/Hr) IV Continuous <Continuous>  valsartan 160 milliGRAM(s) Oral daily    MEDICATIONS  (PRN):      Vital Signs Last 24 Hrs  T(C): 36.6 (12 Jan 2024 11:04), Max: 37.3 (12 Jan 2024 08:47)  T(F): 97.9 (12 Jan 2024 11:04), Max: 99.1 (12 Jan 2024 08:47)  HR: 55 (12 Jan 2024 11:04) (50 - 61)  BP: 152/78 (12 Jan 2024 11:04) (125/63 - 186/77)  BP(mean): 90 (12 Jan 2024 09:37) (90 - 90)  RR: 20 (12 Jan 2024 11:04) (15 - 22)  SpO2: 95% (12 Jan 2024 11:04) (92% - 98%)    Parameters below as of 12 Jan 2024 11:04  Patient On (Oxygen Delivery Method): room air      CAPILLARY BLOOD GLUCOSE        I&O's Summary      PHYSICAL EXAM:  GENERAL: NAD, well-developed  HEAD:  Atraumatic, Normocephalic  EYES: EOMI, PERRLA, conjunctiva and sclera clear  NECK: Supple, No JVD  CHEST/LUNG: Clear to auscultation bilaterally; No wheeze  HEART: Regular rate and rhythm; No murmurs, rubs, or gallops  ABDOMEN: Soft, Nontender, Nondistended; Bowel sounds present  EXTREMITIES:  2+ Peripheral Pulses, No clubbing, cyanosis, or edema  PSYCH: AAOx3  NEUROLOGY: non-focal  SKIN: No rashes or lesions    LABS:                        13.2   7.52  )-----------( 291      ( 12 Jan 2024 06:56 )             40.3     01-12    137  |  102  |  23  ----------------------------<  107<H>  4.1   |  25  |  0.59    Ca    8.9      12 Jan 2024 06:56  Phos  3.6     01-12  Mg     2.3     01-12            Urinalysis Basic - ( 12 Jan 2024 06:56 )    Color: x / Appearance: x / SG: x / pH: x  Gluc: 107 mg/dL / Ketone: x  / Bili: x / Urobili: x   Blood: x / Protein: x / Nitrite: x   Leuk Esterase: x / RBC: x / WBC x   Sq Epi: x / Non Sq Epi: x / Bacteria: x        RADIOLOGY & ADDITIONAL TESTS:    Imaging Personally Reviewed:    Consultant(s) Notes Reviewed:      Care Discussed with Consultants/Other Providers:  
DATE OF SERVICE: 01-13-24 @ 10:35    Patient is a 78y old  Female who presents with a chief complaint of abdominal pain after eating (12 Jan 2024 11:33)      SUBJECTIVE / OVERNIGHT EVENTS:  No chest pain. No shortness of breath. No complaints. No events overnight.     MEDICATIONS  (STANDING):  atorvastatin 40 milliGRAM(s) Oral at bedtime  heparin   Injectable 5000 Unit(s) SubCutaneous every 8 hours  pantoprazole    Tablet 40 milliGRAM(s) Oral before breakfast  polyethylene glycol 3350 17 Gram(s) Oral daily  senna 2 Tablet(s) Oral at bedtime  sodium chloride 0.9%. 500 milliLiter(s) (30 mL/Hr) IV Continuous <Continuous>  valsartan 160 milliGRAM(s) Oral daily    MEDICATIONS  (PRN):      Vital Signs Last 24 Hrs  T(C): 36.8 (13 Jan 2024 05:22), Max: 36.8 (12 Jan 2024 21:02)  T(F): 98.3 (13 Jan 2024 05:22), Max: 98.3 (12 Jan 2024 21:02)  HR: 52 (13 Jan 2024 05:22) (52 - 57)  BP: 153/75 (13 Jan 2024 05:22) (122/74 - 153/75)  BP(mean): --  RR: 18 (13 Jan 2024 05:22) (16 - 20)  SpO2: 96% (13 Jan 2024 05:22) (95% - 98%)    Parameters below as of 13 Jan 2024 05:22  Patient On (Oxygen Delivery Method): room air      CAPILLARY BLOOD GLUCOSE        I&O's Summary    12 Jan 2024 07:01  -  13 Jan 2024 07:00  --------------------------------------------------------  IN: 0 mL / OUT: 10 mL / NET: -10 mL        PHYSICAL EXAM:  GENERAL: NAD, well-developed  HEAD:  Atraumatic, Normocephalic  EYES: EOMI, PERRLA, conjunctiva and sclera clear  NECK: Supple, No JVD  CHEST/LUNG: Clear to auscultation bilaterally; No wheeze  HEART: Regular rate and rhythm; No murmurs, rubs, or gallops  ABDOMEN: Soft, Nontender, Nondistended; Bowel sounds present  EXTREMITIES:  2+ Peripheral Pulses, No clubbing, cyanosis, or edema  PSYCH: AAOx3  NEUROLOGY: non-focal  SKIN: No rashes or lesions    LABS:                        13.2   7.52  )-----------( 291      ( 12 Jan 2024 06:56 )             40.3     01-12    137  |  102  |  23  ----------------------------<  107<H>  4.1   |  25  |  0.59    Ca    8.9      12 Jan 2024 06:56  Phos  3.6     01-12  Mg     2.3     01-12            Urinalysis Basic - ( 12 Jan 2024 06:56 )    Color: x / Appearance: x / SG: x / pH: x  Gluc: 107 mg/dL / Ketone: x  / Bili: x / Urobili: x   Blood: x / Protein: x / Nitrite: x   Leuk Esterase: x / RBC: x / WBC x   Sq Epi: x / Non Sq Epi: x / Bacteria: x    < from: Upper Endoscopy (01.12.24 @ 09:37) >                           Findings:       The examined esophagus was normal.       A few localized, small non-bleeding erosions were found at the pylorus. Biopsies were taken        with a cold forceps for Helicobacter pylori testing.       Diffuse mildly erythematous mucosa was found in the gastric body and in the gastric antrum.        Biopsies were taken with a cold forceps for histology. Estimated blood loss was minimal.       The duodenal bulb and second portion of the duodenum were normal. Biopsies were taken with a        cold forceps for histology.                                                                                                        Impression:          - Normal esophagus.                       - Non-bleeding erosive gastropathy. Biopsied.                       - Erythematous mucosa in the gastric body and antrum.                       - Normal duodenal bulb and second portion of the duodenum. Biopsied.  Recommendation:      - Return patient to [facility] for ongoing care.                       - Advance diet as tolerated.                       - Await pathology results.                       - Use Protonix (pantoprazole) 40 mg PO daily.    < end of copied text >      RADIOLOGY & ADDITIONAL TESTS:    Imaging Personally Reviewed:    Consultant(s) Notes Reviewed:      Care Discussed with Consultants/Other Providers:  
DATE OF SERVICE: 01-10-24 @ 17:39    Patient is a 78y old  Female who presents with a chief complaint of abdominal pain after eating (10 Phong 2024 13:05)      SUBJECTIVE / OVERNIGHT EVENTS:  c/o abd pain after eating    MEDICATIONS  (STANDING):  cefTRIAXone   IVPB 1000 milliGRAM(s) IV Intermittent every 24 hours  heparin   Injectable 5000 Unit(s) SubCutaneous every 8 hours  polyethylene glycol 3350 17 Gram(s) Oral daily  senna 2 Tablet(s) Oral at bedtime  valsartan 160 milliGRAM(s) Oral daily    MEDICATIONS  (PRN):      Vital Signs Last 24 Hrs  T(C): 37.3 (10 Phong 2024 11:12), Max: 37.3 (10 Phong 2024 11:12)  T(F): 99.1 (10 Phong 2024 11:12), Max: 99.1 (10 Phong 2024 11:12)  HR: 62 (10 Phong 2024 11:12) (55 - 87)  BP: 157/83 (10 Phong 2024 11:12) (133/69 - 157/83)  BP(mean): 90 (09 Jan 2024 20:22) (90 - 90)  RR: 18 (10 Phong 2024 11:12) (16 - 18)  SpO2: 94% (10 Phong 2024 11:12) (92% - 95%)    Parameters below as of 10 Phong 2024 11:12  Patient On (Oxygen Delivery Method): room air      CAPILLARY BLOOD GLUCOSE        I&O's Summary      PHYSICAL EXAM:  GENERAL: NAD, well-developed  HEAD:  Atraumatic, Normocephalic  EYES: EOMI, PERRLA, conjunctiva and sclera clear  NECK: Supple, No JVD  CHEST/LUNG: Clear to auscultation bilaterally; No wheeze  HEART: Regular rate and rhythm; No murmurs, rubs, or gallops  ABDOMEN: Soft, Nontender, Nondistended; Bowel sounds present  EXTREMITIES:  2+ Peripheral Pulses, No clubbing, cyanosis, or edema  PSYCH: AAOx3  NEUROLOGY: non-focal  SKIN: No rashes or lesions    LABS:                        13.1   9.18  )-----------( 320      ( 10 Phong 2024 07:20 )             39.7     01-10    138  |  101  |  23  ----------------------------<  118<H>  4.0   |  26  |  0.59    Ca    8.9      10 Phong 2024 07:20  Phos  4.0     01-10  Mg     2.4     01-10    TPro  7.1  /  Alb  4.2  /  TBili  0.4  /  DBili  x   /  AST  36  /  ALT  91<H>  /  AlkPhos  97  01-09          Urinalysis Basic - ( 10 Phong 2024 07:20 )    Color: x / Appearance: x / SG: x / pH: x  Gluc: 118 mg/dL / Ketone: x  / Bili: x / Urobili: x   Blood: x / Protein: x / Nitrite: x   Leuk Esterase: x / RBC: x / WBC x   Sq Epi: x / Non Sq Epi: x / Bacteria: x        RADIOLOGY & ADDITIONAL TESTS:    Imaging Personally Reviewed:    Consultant(s) Notes Reviewed:      Care Discussed with Consultants/Other Providers:  
DATE OF SERVICE: 01-11-24 @ 13:11    Patient is a 78y old  Female who presents with a chief complaint of abdominal pain after eating (11 Jan 2024 09:44)      SUBJECTIVE / OVERNIGHT EVENTS:  No chest pain. No shortness of breath. No new complaints. No events overnight.     MEDICATIONS  (STANDING):  atorvastatin 40 milliGRAM(s) Oral at bedtime  cefTRIAXone   IVPB 1000 milliGRAM(s) IV Intermittent every 24 hours  heparin   Injectable 5000 Unit(s) SubCutaneous every 8 hours  polyethylene glycol 3350 17 Gram(s) Oral daily  senna 2 Tablet(s) Oral at bedtime  valsartan 160 milliGRAM(s) Oral daily    MEDICATIONS  (PRN):      Vital Signs Last 24 Hrs  T(C): 37.4 (11 Jan 2024 11:29), Max: 37.4 (11 Jan 2024 11:29)  T(F): 99.3 (11 Jan 2024 11:29), Max: 99.3 (11 Jan 2024 11:29)  HR: 64 (11 Jan 2024 11:29) (57 - 67)  BP: 123/75 (11 Jan 2024 11:29) (123/75 - 169/68)  BP(mean): --  RR: 18 (11 Jan 2024 11:29) (16 - 18)  SpO2: 94% (11 Jan 2024 11:29) (92% - 96%)    Parameters below as of 11 Jan 2024 11:29  Patient On (Oxygen Delivery Method): room air      CAPILLARY BLOOD GLUCOSE        I&O's Summary      PHYSICAL EXAM:  GENERAL: NAD, well-developed  HEAD:  Atraumatic, Normocephalic  EYES: EOMI, PERRLA, conjunctiva and sclera clear  NECK: Supple, No JVD  CHEST/LUNG: Clear to auscultation bilaterally; No wheeze  HEART: Regular rate and rhythm; No murmurs, rubs, or gallops  ABDOMEN: Soft, Nontender, Nondistended; Bowel sounds present  EXTREMITIES:  2+ Peripheral Pulses, No clubbing, cyanosis, or edema  PSYCH: AAOx3  NEUROLOGY: non-focal  SKIN: No rashes or lesions    LABS:                        13.1   9.18  )-----------( 320      ( 10 Phong 2024 07:20 )             39.7     01-10    138  |  101  |  23  ----------------------------<  118<H>  4.0   |  26  |  0.59    Ca    8.9      10 Phong 2024 07:20  Phos  4.0     01-10  Mg     2.4     01-10            Urinalysis Basic - ( 10 Phong 2024 07:20 )    Color: x / Appearance: x / SG: x / pH: x  Gluc: 118 mg/dL / Ketone: x  / Bili: x / Urobili: x   Blood: x / Protein: x / Nitrite: x   Leuk Esterase: x / RBC: x / WBC x   Sq Epi: x / Non Sq Epi: x / Bacteria: x        RADIOLOGY & ADDITIONAL TESTS:    Imaging Personally Reviewed:    Consultant(s) Notes Reviewed:      Care Discussed with Consultants/Other Providers:  
Surgery Progress Note    S: Patient seen and examined. No acute events overnight.     O:  General: NAD, resting comfortably  Pulmonary: normal resp effort, CTA-B  Cardiovascular: NSR  Abdominal: soft, ND/NT, no organomegaly, no guarding or rebound tenderness  Extremities: WWP, normal strength, no clubbing/cyanosis/edema, palpable B/L radial pulses, palpable DP pulses and PT pulses B/L.   Neuro: A/O x 3, CNs II-XII grossly intact, normal sensation, no focal deficits    Vital Signs Last 24 Hrs  T(C): 36.9 (11 Jan 2024 05:17), Max: 37.3 (10 Phong 2024 11:12)  T(F): 98.5 (11 Jan 2024 05:17), Max: 99.1 (10 Phong 2024 11:12)  HR: 58 (11 Jan 2024 05:17) (57 - 67)  BP: 163/71 (11 Jan 2024 05:17) (157/83 - 169/68)  BP(mean): --  RR: 18 (11 Jan 2024 05:17) (16 - 18)  SpO2: 92% (11 Jan 2024 05:17) (92% - 96%)    Parameters below as of 11 Jan 2024 05:17  Patient On (Oxygen Delivery Method): room air        I&O's Detail                            13.1   9.18  )-----------( 320      ( 10 Phong 2024 07:20 )             39.7       01-10    138  |  101  |  23  ----------------------------<  118<H>  4.0   |  26  |  0.59    Ca    8.9      10 Phong 2024 07:20  Phos  4.0     01-10  Mg     2.4     01-10        
Surgery Progress Note    S: Patient seen and examined. No acute events overnight.     O:  General: NAD, resting comfortably  Pulmonary: normal resp effort, CTA-B  Cardiovascular: NSR  Abdominal: soft, ND/NT, no organomegaly, no guarding or rebound tenderness  Extremities: WWP, normal strength, no clubbing/cyanosis/edema, palpable B/L radial pulses, palpable DP pulses and PT pulses B/L.   Neuro: A/O x 3, CNs II-XII grossly intact, normal sensation, no focal deficits    Vital Signs Last 24 Hrs  T(C): 37.3 (10 Phong 2024 11:12), Max: 37.3 (10 Phong 2024 11:12)  T(F): 99.1 (10 Phong 2024 11:12), Max: 99.1 (10 Phong 2024 11:12)  HR: 62 (10 Phong 2024 11:12) (55 - 87)  BP: 157/83 (10 Phong 2024 11:12) (133/69 - 164/76)  BP(mean): 90 (09 Jan 2024 20:22) (90 - 90)  RR: 18 (10 Phong 2024 11:12) (16 - 18)  SpO2: 94% (10 Phong 2024 11:12) (92% - 98%)    Parameters below as of 10 Phong 2024 11:12  Patient On (Oxygen Delivery Method): room air        I&O's Detail                            13.1   9.18  )-----------( 320      ( 10 Phong 2024 07:20 )             39.7       01-10    138  |  101  |  23  ----------------------------<  118<H>  4.0   |  26  |  0.59    Ca    8.9      10 Phong 2024 07:20  Phos  4.0     01-10  Mg     2.4     01-10    TPro  7.1  /  Alb  4.2  /  TBili  0.4  /  DBili  x   /  AST  36  /  ALT  91<H>  /  AlkPhos  97  01-09

## 2024-01-13 NOTE — DISCHARGE NOTE NURSING/CASE MANAGEMENT/SOCIAL WORK - NSDPLANG ASIS_GEN_ALL_CORE
SUBJECTIVE  Patient ID: Arleth Padgett is a 27 year old female.  12/7/2021    Chief Complaint   Patient presents with   • Office Visit   • Physical     fasting   • Forms Completion     Here for annual exam. 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 Needs PPD    Pap done 12/17/20, WNL  Periods are regular on OCP  Monogamous with     Having some vaginal discharge and itching. Treated with fluconazole for possible yeast infection in October and the itching improved. Still having mild vaginal discomfort and itching.    Onychomycosis: treated with terbinafine earlier this year with good results, but the fungus returned. She inquires about repeating treatment.            Arleth has a past medical history of Back muscle spasm (12/17/2020) and Screening for tuberculosis (4/28/2020).  Arleth has Annual physical exam; Screening examination for pulmonary tuberculosis; Onychomycosis; Encounter for surveillance of contraceptive pills; Vaginal discharge; and Screening for cervical cancer on their problem list.  Arleth has a past surgical history that includes Toe Surgery (2018).  Her family history includes Anxiety disorder in her sister; Blood Disorder in her mother; Cancer, Breast in her paternal grandmother; Cancer, Colon in her maternal great-grandfather; Heart disease in her maternal grandfather; Patient is unaware of any medical problems in her sister; Sjogren's Syndrome in her father.  Arleth reports that she has never smoked. She has never used smokeless tobacco. She reports current alcohol use. She reports that she does not use drugs.  Arleth has a current medication list which includes the following prescription(s): tri-sprintec, terbinafine, and cetirizine.  Arleth   Current Outpatient Medications   Medication Sig Dispense Refill   • Tri-Sprintec 0.18/0.215/0.25 MG-35 MCG tablet Take 1 tablet by mouth daily. 84 tablet 3   • terbinafine (LamISIL) 250 MG tablet Take 1 tablet by mouth  daily. 42 tablet 1   • cetirizine (ZyrTEC) 10 MG tablet Take 10 mg by mouth daily.       No current facility-administered medications for this visit.     Arleht is allergic to seasonal.    Review of Systems   Constitutional: Negative for appetite change, chills, fatigue, fever and unexpected weight change.   HENT: Negative for ear pain, hearing loss, sinus pressure, sinus pain and trouble swallowing.    Eyes: Negative for pain, redness and visual disturbance.   Respiratory: Negative for cough, chest tightness, shortness of breath and wheezing.    Cardiovascular: Negative for chest pain, palpitations and leg swelling.   Gastrointestinal: Negative for abdominal pain, blood in stool, constipation, diarrhea, nausea and vomiting.   Endocrine: Negative for cold intolerance, heat intolerance, polydipsia, polyphagia and polyuria.   Genitourinary: Positive for vaginal discharge. Negative for dysuria, frequency, hematuria and urgency.   Musculoskeletal: Negative for arthralgias, back pain and joint swelling.   Skin: Negative for rash and wound.   Allergic/Immunologic: Negative for environmental allergies.   Neurological: Negative for dizziness, seizures, weakness, numbness and headaches.   Hematological: Does not bruise/bleed easily.   Psychiatric/Behavioral: Negative for sleep disturbance and suicidal ideas. The patient is not nervous/anxious.    All other systems reviewed and are negative.      OBJECTIVE  Physical Exam  Vitals and nursing note reviewed.   Constitutional:       General: She is not in acute distress.     Appearance: Normal appearance. She is well-developed, well-groomed and normal weight.   HENT:      Head: Normocephalic and atraumatic.      Right Ear: Tympanic membrane, ear canal and external ear normal.      Left Ear: Tympanic membrane, ear canal and external ear normal.      Nose: Nose normal.      Mouth/Throat:      Lips: Pink.      Mouth: Mucous membranes are moist.      Pharynx: Oropharynx is clear.  Uvula midline.      Tonsils: No tonsillar exudate.   Eyes:      General: Lids are normal.      Extraocular Movements:      Right eye: Normal extraocular motion.      Left eye: Normal extraocular motion.      Conjunctiva/sclera: Conjunctivae normal.      Pupils: Pupils are equal, round, and reactive to light.   Neck:      Thyroid: No thyroid mass or thyromegaly.      Trachea: Trachea normal.   Cardiovascular:      Rate and Rhythm: Normal rate and regular rhythm.      Pulses: Normal pulses.      Heart sounds: Normal heart sounds, S1 normal and S2 normal. No murmur heard.      Pulmonary:      Effort: Pulmonary effort is normal.      Breath sounds: Normal breath sounds and air entry.   Chest:   Breasts:      Right: Normal. No axillary adenopathy or supraclavicular adenopathy.      Left: Normal. No axillary adenopathy or supraclavicular adenopathy.       Abdominal:      General: Abdomen is flat. Bowel sounds are normal.      Palpations: Abdomen is soft.      Tenderness: There is no abdominal tenderness.      Hernia: No hernia is present.   Genitourinary:     Pubic Area: No rash.       Labia:         Right: No rash, tenderness, lesion or injury.         Left: No rash, tenderness, lesion or injury.       Vagina: Normal.   Musculoskeletal:         General: Normal range of motion.      Cervical back: Full passive range of motion without pain, normal range of motion and neck supple.      Right lower leg: No edema.      Left lower leg: No edema.   Lymphadenopathy:      Cervical: No cervical adenopathy.      Upper Body:      Right upper body: No supraclavicular, axillary or pectoral adenopathy.      Left upper body: No supraclavicular, axillary or pectoral adenopathy.   Skin:     General: Skin is warm and dry.      Findings: No rash.   Neurological:      General: No focal deficit present.      Mental Status: She is alert and oriented to person, place, and time. Mental status is at baseline.      Cranial Nerves: No cranial nerve  Yes deficit.      Sensory: No sensory deficit.      Deep Tendon Reflexes: Reflexes are normal and symmetric.   Psychiatric:         Attention and Perception: Attention normal.         Mood and Affect: Mood normal.         Speech: Speech normal.         Behavior: Behavior normal. Behavior is cooperative.         Thought Content: Thought content normal.         Judgment: Judgment normal.         ASSESSMENT  Problem List Items Addressed This Visit        Genitourinary and Reproductive    Vaginal discharge     Swabone ordered         Relevant Orders    SWABONE VAGINITIS PANEL       Health Encounters    Annual physical exam - Primary     Immunizations up to date  Pap smear up to date  Monthly breast exam discussed  Check labs  Increase exercise         Relevant Orders    CBC WITH DIFFERENTIAL    COMPREHENSIVE METABOLIC PANEL    THYROID STIMULATING HORMONE    LIPID PANEL WITH REFLEX       Infectious Diseases    Screening examination for pulmonary tuberculosis    Relevant Orders    TB INTRADERMAL TEST       Skin    Onychomycosis     Will retreat with terbinafine x 12 weeks  Repeat CMP in 6 weeks                Pap smears and HPV testing as according to ACOG guidelines.  Fasting lipid panel and glucose  Also recommend the following:  Healthy eating habits, Increase exercise, Adequate intake of dietary calcium and Breast awareness  Continue with yearly breast and pelvic exams.  Thyroid studies    Health Maintenance Summary     Varicella Vaccine (1 of 2 - 2-dose childhood series)  Postponed until 12/12/2110    DTaP/Tdap/Td Vaccine (2 - Td or Tdap)  Next due on 7/12/2022    Depression Screening (Yearly)  Next due on 12/7/2022    Cervical Cancer Screening - <30 3 year (Every 3 Years)  Next due on 12/17/2023    COVID-19 Vaccine   Completed    Influenza Vaccine   Completed    Hepatitis B Vaccine   Aged Out    Meningococcal Vaccine   Aged Out    HPV Vaccine   Aged Out    Pneumococcal Vaccine 0-64   Aged Out          Schedule follow  up: in a year, at next annual exam    Kaylah Young, CNP

## 2024-01-16 RX ORDER — VALSARTAN 160 MG/1
160 TABLET, COATED ORAL DAILY
Qty: 90 | Refills: 2 | Status: ACTIVE | COMMUNITY
Start: 1900-01-01 | End: 1900-01-01

## 2024-01-18 ENCOUNTER — NON-APPOINTMENT (OUTPATIENT)
Age: 79
End: 2024-01-18

## 2024-01-18 LAB — SURGICAL PATHOLOGY STUDY: SIGNIFICANT CHANGE UP

## 2024-01-19 ENCOUNTER — RESULT REVIEW (OUTPATIENT)
Age: 79
End: 2024-01-19

## 2024-01-19 ENCOUNTER — APPOINTMENT (OUTPATIENT)
Dept: INFUSION THERAPY | Facility: HOSPITAL | Age: 79
End: 2024-01-19

## 2024-01-19 DIAGNOSIS — C52 MALIGNANT NEOPLASM OF VAGINA: ICD-10-CM

## 2024-01-19 LAB
BASOPHILS # BLD AUTO: 0.02 K/UL — SIGNIFICANT CHANGE UP (ref 0–0.2)
BASOPHILS NFR BLD AUTO: 0.1 % — SIGNIFICANT CHANGE UP (ref 0–2)
EOSINOPHIL # BLD AUTO: 0.1 K/UL — SIGNIFICANT CHANGE UP (ref 0–0.5)
EOSINOPHIL NFR BLD AUTO: 0.7 % — SIGNIFICANT CHANGE UP (ref 0–6)
HCT VFR BLD CALC: 40.7 % — SIGNIFICANT CHANGE UP (ref 34.5–45)
HGB BLD-MCNC: 13.7 G/DL — SIGNIFICANT CHANGE UP (ref 11.5–15.5)
IMM GRANULOCYTES NFR BLD AUTO: 1.3 % — HIGH (ref 0–0.9)
LYMPHOCYTES # BLD AUTO: 0.98 K/UL — LOW (ref 1–3.3)
LYMPHOCYTES # BLD AUTO: 7.2 % — LOW (ref 13–44)
MCHC RBC-ENTMCNC: 31.6 PG — SIGNIFICANT CHANGE UP (ref 27–34)
MCHC RBC-ENTMCNC: 33.7 G/DL — SIGNIFICANT CHANGE UP (ref 32–36)
MCV RBC AUTO: 93.8 FL — SIGNIFICANT CHANGE UP (ref 80–100)
MONOCYTES # BLD AUTO: 1.18 K/UL — HIGH (ref 0–0.9)
MONOCYTES NFR BLD AUTO: 8.7 % — SIGNIFICANT CHANGE UP (ref 2–14)
NEUTROPHILS # BLD AUTO: 11.14 K/UL — HIGH (ref 1.8–7.4)
NEUTROPHILS NFR BLD AUTO: 82 % — HIGH (ref 43–77)
NRBC # BLD: 0 /100 WBCS — SIGNIFICANT CHANGE UP (ref 0–0)
PLATELET # BLD AUTO: 244 K/UL — SIGNIFICANT CHANGE UP (ref 150–400)
RBC # BLD: 4.34 M/UL — SIGNIFICANT CHANGE UP (ref 3.8–5.2)
RBC # FLD: 14.6 % — HIGH (ref 10.3–14.5)
WBC # BLD: 13.59 K/UL — HIGH (ref 3.8–10.5)
WBC # FLD AUTO: 13.59 K/UL — HIGH (ref 3.8–10.5)

## 2024-01-20 LAB
ALBUMIN SERPL ELPH-MCNC: 3.9 G/DL — SIGNIFICANT CHANGE UP (ref 3.3–5)
ALP SERPL-CCNC: 96 U/L — SIGNIFICANT CHANGE UP (ref 40–120)
ALT FLD-CCNC: 30 U/L — SIGNIFICANT CHANGE UP (ref 10–45)
ANION GAP SERPL CALC-SCNC: 13 MMOL/L — SIGNIFICANT CHANGE UP (ref 5–17)
AST SERPL-CCNC: 31 U/L — SIGNIFICANT CHANGE UP (ref 10–40)
BILIRUB SERPL-MCNC: 0.3 MG/DL — SIGNIFICANT CHANGE UP (ref 0.2–1.2)
BUN SERPL-MCNC: 29 MG/DL — HIGH (ref 7–23)
CALCIUM SERPL-MCNC: 8.6 MG/DL — SIGNIFICANT CHANGE UP (ref 8.4–10.5)
CHLORIDE SERPL-SCNC: 97 MMOL/L — SIGNIFICANT CHANGE UP (ref 96–108)
CO2 SERPL-SCNC: 24 MMOL/L — SIGNIFICANT CHANGE UP (ref 22–31)
CREAT SERPL-MCNC: 0.64 MG/DL — SIGNIFICANT CHANGE UP (ref 0.5–1.3)
EGFR: 90 ML/MIN/1.73M2 — SIGNIFICANT CHANGE UP
GLUCOSE SERPL-MCNC: 111 MG/DL — HIGH (ref 70–99)
LDH SERPL L TO P-CCNC: 509 U/L — HIGH (ref 50–242)
POTASSIUM SERPL-MCNC: 4.4 MMOL/L — SIGNIFICANT CHANGE UP (ref 3.5–5.3)
POTASSIUM SERPL-SCNC: 4.4 MMOL/L — SIGNIFICANT CHANGE UP (ref 3.5–5.3)
PROT SERPL-MCNC: 6.6 G/DL — SIGNIFICANT CHANGE UP (ref 6–8.3)
SODIUM SERPL-SCNC: 133 MMOL/L — LOW (ref 135–145)
TSH SERPL-MCNC: 8.17 UIU/ML — HIGH (ref 0.27–4.2)

## 2024-01-24 DIAGNOSIS — Z51.11 ENCOUNTER FOR ANTINEOPLASTIC CHEMOTHERAPY: ICD-10-CM

## 2024-02-19 ENCOUNTER — RESULT REVIEW (OUTPATIENT)
Age: 79
End: 2024-02-19

## 2024-02-19 ENCOUNTER — APPOINTMENT (OUTPATIENT)
Dept: INFUSION THERAPY | Facility: HOSPITAL | Age: 79
End: 2024-02-19

## 2024-02-19 LAB
ALBUMIN SERPL ELPH-MCNC: 3.9 G/DL — SIGNIFICANT CHANGE UP (ref 3.3–5)
ALP SERPL-CCNC: 79 U/L — SIGNIFICANT CHANGE UP (ref 40–120)
ALT FLD-CCNC: 36 U/L — SIGNIFICANT CHANGE UP (ref 10–45)
ANION GAP SERPL CALC-SCNC: 12 MMOL/L — SIGNIFICANT CHANGE UP (ref 5–17)
AST SERPL-CCNC: 22 U/L — SIGNIFICANT CHANGE UP (ref 10–40)
BASOPHILS # BLD AUTO: 0.04 K/UL — SIGNIFICANT CHANGE UP (ref 0–0.2)
BASOPHILS NFR BLD AUTO: 0.3 % — SIGNIFICANT CHANGE UP (ref 0–2)
BILIRUB SERPL-MCNC: 0.4 MG/DL — SIGNIFICANT CHANGE UP (ref 0.2–1.2)
BUN SERPL-MCNC: 29 MG/DL — HIGH (ref 7–23)
CALCIUM SERPL-MCNC: 9.6 MG/DL — SIGNIFICANT CHANGE UP (ref 8.4–10.5)
CHLORIDE SERPL-SCNC: 101 MMOL/L — SIGNIFICANT CHANGE UP (ref 96–108)
CO2 SERPL-SCNC: 25 MMOL/L — SIGNIFICANT CHANGE UP (ref 22–31)
CREAT SERPL-MCNC: 0.76 MG/DL — SIGNIFICANT CHANGE UP (ref 0.5–1.3)
EGFR: 80 ML/MIN/1.73M2 — SIGNIFICANT CHANGE UP
EOSINOPHIL # BLD AUTO: 0.04 K/UL — SIGNIFICANT CHANGE UP (ref 0–0.5)
EOSINOPHIL NFR BLD AUTO: 0.3 % — SIGNIFICANT CHANGE UP (ref 0–6)
GLUCOSE SERPL-MCNC: 77 MG/DL — SIGNIFICANT CHANGE UP (ref 70–99)
HCT VFR BLD CALC: 38.9 % — SIGNIFICANT CHANGE UP (ref 34.5–45)
HGB BLD-MCNC: 12.9 G/DL — SIGNIFICANT CHANGE UP (ref 11.5–15.5)
IMM GRANULOCYTES NFR BLD AUTO: 3.1 % — HIGH (ref 0–0.9)
LDH SERPL L TO P-CCNC: 220 U/L — SIGNIFICANT CHANGE UP (ref 50–242)
LYMPHOCYTES # BLD AUTO: 1.19 K/UL — SIGNIFICANT CHANGE UP (ref 1–3.3)
LYMPHOCYTES # BLD AUTO: 9.8 % — LOW (ref 13–44)
MCHC RBC-ENTMCNC: 31.1 PG — SIGNIFICANT CHANGE UP (ref 27–34)
MCHC RBC-ENTMCNC: 33.2 G/DL — SIGNIFICANT CHANGE UP (ref 32–36)
MCV RBC AUTO: 93.7 FL — SIGNIFICANT CHANGE UP (ref 80–100)
MONOCYTES # BLD AUTO: 1.16 K/UL — HIGH (ref 0–0.9)
MONOCYTES NFR BLD AUTO: 9.5 % — SIGNIFICANT CHANGE UP (ref 2–14)
NEUTROPHILS # BLD AUTO: 9.38 K/UL — HIGH (ref 1.8–7.4)
NEUTROPHILS NFR BLD AUTO: 77 % — SIGNIFICANT CHANGE UP (ref 43–77)
NRBC # BLD: 0 /100 WBCS — SIGNIFICANT CHANGE UP (ref 0–0)
PLATELET # BLD AUTO: 347 K/UL — SIGNIFICANT CHANGE UP (ref 150–400)
POTASSIUM SERPL-MCNC: 4.1 MMOL/L — SIGNIFICANT CHANGE UP (ref 3.5–5.3)
POTASSIUM SERPL-SCNC: 4.1 MMOL/L — SIGNIFICANT CHANGE UP (ref 3.5–5.3)
PROT SERPL-MCNC: 6.8 G/DL — SIGNIFICANT CHANGE UP (ref 6–8.3)
RBC # BLD: 4.15 M/UL — SIGNIFICANT CHANGE UP (ref 3.8–5.2)
RBC # FLD: 13.8 % — SIGNIFICANT CHANGE UP (ref 10.3–14.5)
SODIUM SERPL-SCNC: 138 MMOL/L — SIGNIFICANT CHANGE UP (ref 135–145)
TSH SERPL-MCNC: 4.89 UIU/ML — HIGH (ref 0.27–4.2)
WBC # BLD: 12.19 K/UL — HIGH (ref 3.8–10.5)
WBC # FLD AUTO: 12.19 K/UL — HIGH (ref 3.8–10.5)

## 2024-02-29 ENCOUNTER — OUTPATIENT (OUTPATIENT)
Dept: OUTPATIENT SERVICES | Facility: HOSPITAL | Age: 79
LOS: 1 days | Discharge: HOME | End: 2024-02-29

## 2024-02-29 DIAGNOSIS — C43.31 MALIGNANT MELANOMA OF NOSE: ICD-10-CM

## 2024-03-07 ENCOUNTER — APPOINTMENT (OUTPATIENT)
Dept: HEMATOLOGY ONCOLOGY | Facility: CLINIC | Age: 79
End: 2024-03-07
Payer: MEDICAID

## 2024-03-07 VITALS
SYSTOLIC BLOOD PRESSURE: 168 MMHG | TEMPERATURE: 97 F | RESPIRATION RATE: 16 BRPM | BODY MASS INDEX: 31.28 KG/M2 | WEIGHT: 149.03 LBS | DIASTOLIC BLOOD PRESSURE: 81 MMHG | OXYGEN SATURATION: 94 % | HEIGHT: 57.99 IN | HEART RATE: 79 BPM

## 2024-03-07 PROCEDURE — 99214 OFFICE O/P EST MOD 30 MIN: CPT

## 2024-03-07 PROCEDURE — G2211 COMPLEX E/M VISIT ADD ON: CPT | Mod: NC,1L

## 2024-03-07 NOTE — REVIEW OF SYSTEMS
[Fatigue] : fatigue [Diarrhea: Grade 0] : Diarrhea: Grade 0 [Dysuria] : dysuria [Anxiety] : anxiety [Depression] : depression [Negative] : Allergic/Immunologic

## 2024-03-07 NOTE — HISTORY OF PRESENT ILLNESS
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  She is currently living in [place] with her daughter.  Please see my initial consultation note for her complex prior history.  Briefly, [place] came [place] the [place] in [place] from [facility] and then presented to the ED on 05/30/2023 for pain with urination as well as a vaginal mass.  An abdominal and pelvic CT scan, performed on 05/30/2023, demonstrated a lobulated 2.0 cm left inguinal node and a 1.5 cm right inguinal node, as well as an indeterminate 2.6 cm left adrenal nodule. Transabdominal and translabial sonogram demonstrated a 1.2 x 0.6 x 0.5 cm lesion protruding from the vaginal opening. She ultimately underwent a biopsy on 06/08/2023, with pathology consistent with an at least 4.1 mm ulcerated melanoma, with positive peripheral margins. There are 4 mitoses per mm2.  There is no lymphovascular invasion or neurotropism.  There are nonbrisk TILS.   She was seen by Dr. Gary Goldberg of gynecologic oncology on 06/21/2023, and then was referred to this clinic for further management.  Ultrasound guided biopsy of the regional nodes, performed on 06/30/2023, demonstrated disease in both the right and left biopsied inguinal nodes. Brain MRI, performed on 07/06/2023, was unremarkable. PET/CT scan, performed on 07/16/2023, demonstrated increased FDG uptake in the perineum and inguinal notes. There was uptake in a left adrenal nodule and hypermetabolic nodes were present in the chest and bilateral hilar regions.  She initiated therapy with nivolumab/relatlimab and received concurrent radiotherapy to the vaginal mass and the bilateral inguinal nodes.  Since beginning therapy, her vaginal bleeding has decreased and her pain is now well controlled.  She underwent a chest CT and an abdominal and pelvic MRI on 09/19/2023. This study demonstrated stable to slightly decreased thoracic lymphadenopathy, a left adrenal adenoma, a stable left hepatic lesion likely consistent with an adenoma or FNH, and poorly visualized inguinal and vaginal regions.   On 11/24/2023, she underwent an abdominal and pelvic MRI.  These studies, when compared with a prior CT scan from 05/30/2023 and a PET/CT dated 07/16/2023 demonstrated interval decrease in size and enhancement of the anterior vaginal mass and decrease in inguinal lymphadenopathy.  A 0.8 cm left hepatic lobe lesion is stable since 05/30/2023.  Since she was last seen on 12/07/2023, she reports continued difficulties with dysuria and intermittent vaginal pain.  She has been noncompliant with her scheduled visits with us.  She remains on fentanyl 25 mcg for her pain with minimal need for breakthrough medication.  She has discontinued her Lexapro.  PAST MEDICAL HISTORY: 1. Hypertension 2. Arthritis of the bilateral knees 3. status post knee surgery  SOCIAL HISTORY: She had 7 children, 5 of whom are alive.  She is currently residing with her daughter in [place].  ALLERGIES:  None

## 2024-03-07 NOTE — ASSESSMENT
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 She has completed definitive radiation to the vagina and inguinal nodes and is now on nivolumab/relatlimab, with her last dose on 12/04/2024.  She has not been seen in this clinic since 12/07/2024.  At this time, we will arrange for restaging studies with a chest CT and an abdominal and pelvic MRI.  We will have her follow up with Dr. Gary Goldberg regarding the vaginal pain which may be related to her tumor or post-radiation changes.  She has been unable to pay for next generation sequencing of her tumor thus far.  We will refer her to [facility] to discuss various financial support options.  As she will be returning to [facility] in May, I have asked her family to identify an oncologist there with whom I can speak.  I will see her back in this clinic after her next set of imaging studies to discuss next steps.

## 2024-03-15 ENCOUNTER — APPOINTMENT (OUTPATIENT)
Dept: ORTHOPEDIC SURGERY | Facility: CLINIC | Age: 79
End: 2024-03-15
Payer: MEDICAID

## 2024-03-15 VITALS
HEART RATE: 74 BPM | HEIGHT: 60 IN | DIASTOLIC BLOOD PRESSURE: 65 MMHG | SYSTOLIC BLOOD PRESSURE: 134 MMHG | BODY MASS INDEX: 30.63 KG/M2 | OXYGEN SATURATION: 95 % | WEIGHT: 156 LBS

## 2024-03-15 DIAGNOSIS — M25.561 PAIN IN RIGHT KNEE: ICD-10-CM

## 2024-03-15 DIAGNOSIS — M25.562 PAIN IN RIGHT KNEE: ICD-10-CM

## 2024-03-15 DIAGNOSIS — M17.0 BILATERAL PRIMARY OSTEOARTHRITIS OF KNEE: ICD-10-CM

## 2024-03-15 PROCEDURE — 99203 OFFICE O/P NEW LOW 30 MIN: CPT | Mod: 25

## 2024-03-15 PROCEDURE — 73564 X-RAY EXAM KNEE 4 OR MORE: CPT | Mod: LT,RT

## 2024-03-15 RX ORDER — HYALURONATE SODIUM, STABILIZED 88 MG/4 ML
88 SYRINGE (ML) INTRAARTICULAR
Qty: 2 | Refills: 0 | Status: ACTIVE | COMMUNITY
Start: 2024-03-15

## 2024-03-15 NOTE — PHYSICAL EXAM
[Wheelchair] : uses a wheelchair [Slightly Antalgic] : slightly antalgic [DP] : dorsalis pedis 2+ and symmetric bilaterally [LE] : Sensory: Intact in bilateral lower extremities [PT] : posterior tibial 2+ and symmetric bilaterally [Normal] : Alert and in no acute distress [Poor Appearance] : well-appearing [Acute Distress] : not in acute distress [Obese] : not obese [de-identified] : AP, lateral, tunnel and sunrise x-rays of both knees taken today demonstrate severe tricompartmental osteoarthritis. [de-identified] : The patient has no respiratory distress. Mood and affect are normal. The patient is alert and oriented to person, place and time. There is no pain with active or passive motion of the hips.  Examination of the knees demonstrates medial tenderness bilaterally.  Quadriceps and hamstring function are intact.  There is no instability of collateral or cruciate ligaments.  Range of motion 0 to 95 degrees right, 0 to 100 degrees left.  The calves are soft and nontender.  The skin is intact.  There is no lymphedema.

## 2024-03-15 NOTE — HISTORY OF PRESENT ILLNESS
[de-identified] : 79-year-old female presents for initial chronic bilateral knee pain worsening over the past 2 years. She complains of constant aching pain in the knees, R>L, worse with standing and walking. She is only able to walk with a walker for short distances, uses a wheelchair otherwise. She takes Tylenol on occasion for pain. She denies prior treatment on the knees.

## 2024-03-15 NOTE — DISCUSSION/SUMMARY
[de-identified] : The patient has osteoarthritis of the knees.  I have discussed the pathology, natural history and treatment options with the patient and her daughter who translates.  She is interested in trying hyaluronic acid injections.  We will attempt to get authorization from the insurance company.  Failing that she would be treated with corticosteroid injections, physical therapy and possibly NSAIDs.  The family is in agreement with the plan.

## 2024-03-18 ENCOUNTER — RESULT REVIEW (OUTPATIENT)
Age: 79
End: 2024-03-18

## 2024-03-18 ENCOUNTER — APPOINTMENT (OUTPATIENT)
Dept: INFUSION THERAPY | Facility: HOSPITAL | Age: 79
End: 2024-03-18

## 2024-03-18 LAB
BASOPHILS # BLD AUTO: 0.02 K/UL — SIGNIFICANT CHANGE UP (ref 0–0.2)
BASOPHILS NFR BLD AUTO: 0.2 % — SIGNIFICANT CHANGE UP (ref 0–2)
EOSINOPHIL # BLD AUTO: 0 K/UL — SIGNIFICANT CHANGE UP (ref 0–0.5)
EOSINOPHIL NFR BLD AUTO: 0 % — SIGNIFICANT CHANGE UP (ref 0–6)
HCT VFR BLD CALC: 39.5 % — SIGNIFICANT CHANGE UP (ref 34.5–45)
HGB BLD-MCNC: 13 G/DL — SIGNIFICANT CHANGE UP (ref 11.5–15.5)
IMM GRANULOCYTES NFR BLD AUTO: 1 % — HIGH (ref 0–0.9)
LYMPHOCYTES # BLD AUTO: 1.38 K/UL — SIGNIFICANT CHANGE UP (ref 1–3.3)
LYMPHOCYTES # BLD AUTO: 11.9 % — LOW (ref 13–44)
MCHC RBC-ENTMCNC: 31.3 PG — SIGNIFICANT CHANGE UP (ref 27–34)
MCHC RBC-ENTMCNC: 32.9 G/DL — SIGNIFICANT CHANGE UP (ref 32–36)
MCV RBC AUTO: 95 FL — SIGNIFICANT CHANGE UP (ref 80–100)
MONOCYTES # BLD AUTO: 0.74 K/UL — SIGNIFICANT CHANGE UP (ref 0–0.9)
MONOCYTES NFR BLD AUTO: 6.4 % — SIGNIFICANT CHANGE UP (ref 2–14)
NEUTROPHILS # BLD AUTO: 9.35 K/UL — HIGH (ref 1.8–7.4)
NEUTROPHILS NFR BLD AUTO: 80.5 % — HIGH (ref 43–77)
NRBC # BLD: 0 /100 WBCS — SIGNIFICANT CHANGE UP (ref 0–0)
PLATELET # BLD AUTO: 453 K/UL — HIGH (ref 150–400)
RBC # BLD: 4.16 M/UL — SIGNIFICANT CHANGE UP (ref 3.8–5.2)
RBC # FLD: 13.8 % — SIGNIFICANT CHANGE UP (ref 10.3–14.5)
WBC # BLD: 11.61 K/UL — HIGH (ref 3.8–10.5)
WBC # FLD AUTO: 11.61 K/UL — HIGH (ref 3.8–10.5)

## 2024-03-19 LAB
ALBUMIN SERPL ELPH-MCNC: 4.3 G/DL — SIGNIFICANT CHANGE UP (ref 3.3–5)
ALP SERPL-CCNC: 71 U/L — SIGNIFICANT CHANGE UP (ref 40–120)
ALT FLD-CCNC: 16 U/L — SIGNIFICANT CHANGE UP (ref 10–45)
ANION GAP SERPL CALC-SCNC: 23 MMOL/L — HIGH (ref 5–17)
AST SERPL-CCNC: 26 U/L — SIGNIFICANT CHANGE UP (ref 10–40)
BILIRUB SERPL-MCNC: 0.2 MG/DL — SIGNIFICANT CHANGE UP (ref 0.2–1.2)
BUN SERPL-MCNC: 20 MG/DL — SIGNIFICANT CHANGE UP (ref 7–23)
CALCIUM SERPL-MCNC: 10.5 MG/DL — SIGNIFICANT CHANGE UP (ref 8.4–10.5)
CHLORIDE SERPL-SCNC: 99 MMOL/L — SIGNIFICANT CHANGE UP (ref 96–108)
CO2 SERPL-SCNC: 18 MMOL/L — LOW (ref 22–31)
CREAT SERPL-MCNC: 0.53 MG/DL — SIGNIFICANT CHANGE UP (ref 0.5–1.3)
EGFR: 93 ML/MIN/1.73M2 — SIGNIFICANT CHANGE UP
GLUCOSE SERPL-MCNC: 140 MG/DL — HIGH (ref 70–99)
LDH SERPL L TO P-CCNC: 301 U/L — HIGH (ref 50–242)
POTASSIUM SERPL-MCNC: 4.3 MMOL/L — SIGNIFICANT CHANGE UP (ref 3.5–5.3)
POTASSIUM SERPL-SCNC: 4.3 MMOL/L — SIGNIFICANT CHANGE UP (ref 3.5–5.3)
PROT SERPL-MCNC: 7.6 G/DL — SIGNIFICANT CHANGE UP (ref 6–8.3)
SODIUM SERPL-SCNC: 140 MMOL/L — SIGNIFICANT CHANGE UP (ref 135–145)
TSH SERPL-MCNC: 1.08 UIU/ML — SIGNIFICANT CHANGE UP (ref 0.27–4.2)

## 2024-03-25 ENCOUNTER — APPOINTMENT (OUTPATIENT)
Dept: MRI IMAGING | Facility: CLINIC | Age: 79
End: 2024-03-25

## 2024-03-25 ENCOUNTER — APPOINTMENT (OUTPATIENT)
Dept: CT IMAGING | Facility: CLINIC | Age: 79
End: 2024-03-25

## 2024-03-27 ENCOUNTER — RESULT REVIEW (OUTPATIENT)
Age: 79
End: 2024-03-27

## 2024-04-02 DIAGNOSIS — Z51.11 ENCOUNTER FOR ANTINEOPLASTIC CHEMOTHERAPY: ICD-10-CM

## 2024-04-03 ENCOUNTER — APPOINTMENT (OUTPATIENT)
Dept: GYNECOLOGIC ONCOLOGY | Facility: HOSPITAL | Age: 79
End: 2024-04-03
Payer: MEDICAID

## 2024-04-03 ENCOUNTER — OUTPATIENT (OUTPATIENT)
Dept: OUTPATIENT SERVICES | Facility: HOSPITAL | Age: 79
LOS: 1 days | End: 2024-04-03

## 2024-04-03 VITALS
WEIGHT: 151 LBS | HEART RATE: 75 BPM | HEIGHT: 60 IN | DIASTOLIC BLOOD PRESSURE: 79 MMHG | SYSTOLIC BLOOD PRESSURE: 141 MMHG | TEMPERATURE: 98 F | BODY MASS INDEX: 29.64 KG/M2

## 2024-04-03 PROCEDURE — 99213 OFFICE O/P EST LOW 20 MIN: CPT | Mod: GC

## 2024-04-03 NOTE — REASON FOR VISIT
[FreeTextEntry1] : 80 y/o w/ Vaginal Melanoma s/p 3000cGy RT to bilateral inguinal nodes and vagina and 6C nivolumab/relatlimab (last 12/4/2023).  Now presenting with primary complaint of persistent vaginal mass, primarily asking for a surgical opinion. Patient accompanied by daughter who primarily translates.  States that she has been following with the medical oncologists.  Has not noticed the development of new symptoms recently however has had baseline issues with pain control (now improved with Fentanyl) and urinary incontinence and associated vaginal discomfort and pain especially after urinating.  Denies any vaginal bleeding or discharge.  Denies any change to baseline functional status in the past few months.  Patient and daughter mainly concerned about whether there is any surgical option that may help the patient, additionally in light of planned 6 mo trip to [place] [facility] upcoming.

## 2024-04-03 NOTE — ASSESSMENT
[FreeTextEntry1] : 78 y/o w/ Vaginal Melanoma s/p 3000cGy RT to bilateral inguinal nodes and vagina and 6C nivolumab/relatlimab (last 12/4/2023). Now presenting with primary complaint of persistent vaginal mass, primarily asking for a surgical opinion.  #Vaginal Melanoma - Discussed at length with patient and daughter no current role for surgical treatment in light of extent of disease, and risks outweighing the benefits concerning surgical resection. - Discussed that there may be room for changes to therapy or consideration of further radiation in the future however at this time no surgical management is indicated - Additionally discussed role for management of symptoms including vaginal/perineal are to reduce vaginal irritation after urination, and potential consult with palliative care to discuss future pain management and symptom management - Pt and daughter instructed on obtaining records from [place] prior to leaving for [facility] as they already have medical care arranged - Planning for repeat imaging prior to leaving for [facility]  Patient seen with Dr Field and Dr Goldberg Justine Sheu MD PGY3

## 2024-04-03 NOTE — PHYSICAL EXAM
[Chaperone Present] : A chaperone was present in the examining room during all aspects of the physical examination [Normal] : Examination for hernias: No hernia appreciated [Abnormal] : External genitalia: Abnormal [de-identified] : polypoid black lesion noted originating from R vaginal wall protruding into vaginal canal, flat smaller subcentimeter flat black lesion noted on L vaginal wall near vaginal introitus, black lesion surrounding external urethra [de-identified] : radiation skin changes noted around bilateral inguinal folds [Restricted in physically strenuous activity but ambulatory and able to carry out work of a light or sedentary nature] : Status 1- Restricted in physically strenuous activity but ambulatory and able to carry out work of a light or sedentary nature, e.g., light house work, office work

## 2024-04-04 DIAGNOSIS — C52 MALIGNANT NEOPLASM OF VAGINA: ICD-10-CM

## 2024-04-08 ENCOUNTER — OUTPATIENT (OUTPATIENT)
Dept: OUTPATIENT SERVICES | Facility: HOSPITAL | Age: 79
LOS: 1 days | End: 2024-04-08
Payer: MEDICAID

## 2024-04-08 ENCOUNTER — APPOINTMENT (OUTPATIENT)
Dept: CT IMAGING | Facility: IMAGING CENTER | Age: 79
End: 2024-04-08
Payer: MEDICAID

## 2024-04-08 DIAGNOSIS — C52 MALIGNANT NEOPLASM OF VAGINA: ICD-10-CM

## 2024-04-08 PROCEDURE — 74177 CT ABD & PELVIS W/CONTRAST: CPT | Mod: 26

## 2024-04-08 PROCEDURE — 71260 CT THORAX DX C+: CPT | Mod: 26

## 2024-04-08 PROCEDURE — 71260 CT THORAX DX C+: CPT

## 2024-04-08 PROCEDURE — 74177 CT ABD & PELVIS W/CONTRAST: CPT

## 2024-04-11 ENCOUNTER — APPOINTMENT (OUTPATIENT)
Dept: INFUSION THERAPY | Facility: HOSPITAL | Age: 79
End: 2024-04-11

## 2024-04-11 ENCOUNTER — RESULT REVIEW (OUTPATIENT)
Age: 79
End: 2024-04-11

## 2024-04-11 ENCOUNTER — APPOINTMENT (OUTPATIENT)
Dept: HEMATOLOGY ONCOLOGY | Facility: CLINIC | Age: 79
End: 2024-04-11
Payer: MEDICAID

## 2024-04-11 VITALS
WEIGHT: 154.32 LBS | DIASTOLIC BLOOD PRESSURE: 88 MMHG | SYSTOLIC BLOOD PRESSURE: 164 MMHG | OXYGEN SATURATION: 94 % | HEART RATE: 70 BPM | RESPIRATION RATE: 16 BRPM | BODY MASS INDEX: 30.3 KG/M2 | TEMPERATURE: 97.4 F | HEIGHT: 60 IN

## 2024-04-11 LAB
APPEARANCE UR: ABNORMAL
BASOPHILS # BLD AUTO: 0.02 K/UL — SIGNIFICANT CHANGE UP (ref 0–0.2)
BASOPHILS NFR BLD AUTO: 0.3 % — SIGNIFICANT CHANGE UP (ref 0–2)
BILIRUB UR-MCNC: NEGATIVE — SIGNIFICANT CHANGE UP
COLOR SPEC: YELLOW — SIGNIFICANT CHANGE UP
DIFF PNL FLD: ABNORMAL
EOSINOPHIL # BLD AUTO: 0.57 K/UL — HIGH (ref 0–0.5)
EOSINOPHIL NFR BLD AUTO: 8.7 % — HIGH (ref 0–6)
GLUCOSE UR QL: NEGATIVE MG/DL — SIGNIFICANT CHANGE UP
HCT VFR BLD CALC: 38 % — SIGNIFICANT CHANGE UP (ref 34.5–45)
HGB BLD-MCNC: 12.6 G/DL — SIGNIFICANT CHANGE UP (ref 11.5–15.5)
IMM GRANULOCYTES NFR BLD AUTO: 0.5 % — SIGNIFICANT CHANGE UP (ref 0–0.9)
KETONES UR-MCNC: NEGATIVE MG/DL — SIGNIFICANT CHANGE UP
LEUKOCYTE ESTERASE UR-ACNC: ABNORMAL
LYMPHOCYTES # BLD AUTO: 1.2 K/UL — SIGNIFICANT CHANGE UP (ref 1–3.3)
LYMPHOCYTES # BLD AUTO: 18.3 % — SIGNIFICANT CHANGE UP (ref 13–44)
MCHC RBC-ENTMCNC: 31.6 PG — SIGNIFICANT CHANGE UP (ref 27–34)
MCHC RBC-ENTMCNC: 33.2 G/DL — SIGNIFICANT CHANGE UP (ref 32–36)
MCV RBC AUTO: 95.2 FL — SIGNIFICANT CHANGE UP (ref 80–100)
MONOCYTES # BLD AUTO: 0.64 K/UL — SIGNIFICANT CHANGE UP (ref 0–0.9)
MONOCYTES NFR BLD AUTO: 9.7 % — SIGNIFICANT CHANGE UP (ref 2–14)
NEUTROPHILS # BLD AUTO: 4.11 K/UL — SIGNIFICANT CHANGE UP (ref 1.8–7.4)
NEUTROPHILS NFR BLD AUTO: 62.5 % — SIGNIFICANT CHANGE UP (ref 43–77)
NITRITE UR-MCNC: POSITIVE
NRBC # BLD: 0 /100 WBCS — SIGNIFICANT CHANGE UP (ref 0–0)
PH UR: 7.5 — SIGNIFICANT CHANGE UP (ref 5–8)
PLATELET # BLD AUTO: 272 K/UL — SIGNIFICANT CHANGE UP (ref 150–400)
PROT UR-MCNC: 300 MG/DL
RBC # BLD: 3.99 M/UL — SIGNIFICANT CHANGE UP (ref 3.8–5.2)
RBC # FLD: 13.7 % — SIGNIFICANT CHANGE UP (ref 10.3–14.5)
SP GR SPEC: 1.02 — SIGNIFICANT CHANGE UP (ref 1–1.03)
UROBILINOGEN FLD QL: 1 MG/DL — SIGNIFICANT CHANGE UP (ref 0.2–1)
WBC # BLD: 6.57 K/UL — SIGNIFICANT CHANGE UP (ref 3.8–10.5)
WBC # FLD AUTO: 6.57 K/UL — SIGNIFICANT CHANGE UP (ref 3.8–10.5)

## 2024-04-11 PROCEDURE — 99215 OFFICE O/P EST HI 40 MIN: CPT

## 2024-04-11 PROCEDURE — G2211 COMPLEX E/M VISIT ADD ON: CPT | Mod: NC,1L

## 2024-04-11 NOTE — HISTORY OF PRESENT ILLNESS
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  PRIOR THERAPY: Radiotherapy 3000 cGy to bilateral inguinal nodes and vagina  INTERVAL HISTORY: Dina is an 79 year old female from [facility] who returns to this clinic with her family for management of her advanced vaginal melanoma.  She is currently living in [place] with her daughter.  Please see my initial consultation note for her complex prior history.  Briefly, she came [place] the [place] in [place]23 from [facility] and then presented to the ED on 05/30/2023 for pain with urination as well as a vaginal mass.  An abdominal and pelvic CT scan, performed on 05/30/2023, demonstrated a lobulated 2.0 cm left inguinal node and a 1.5 cm right inguinal node, as well as an indeterminate 2.6 cm left adrenal nodule. Transabdominal and translabial sonogram demonstrated a 1.2 x 0.6 x 0.5 cm lesion protruding from the vaginal opening. She ultimately underwent a biopsy on 06/08/2023, with pathology consistent with an at least 4.1 mm ulcerated melanoma, with positive peripheral margins. There are 4 mitoses per mm2.  There is no lymphovascular invasion or neurotropism.  There are nonbrisk TILS.   She was seen by Dr. Gary Goldberg of gynecologic oncology on 06/21/2023, and then was referred to this clinic for further management.  Ultrasound guided biopsy of the regional nodes, performed on 06/30/2023, demonstrated disease in both the right and left biopsied inguinal nodes. Brain MRI, performed on 07/06/2023, was unremarkable. PET/CT scan, performed on 07/16/2023, demonstrated increased FDG uptake in the perineum and inguinal notes. There was uptake in a left adrenal nodule and hypermetabolic nodes were present in the chest and bilateral hilar regions.  She initiated therapy with nivolumab/relatlimab and received concurrent radiotherapy to the vaginal mass and the bilateral inguinal nodes.  Since beginning therapy, her vaginal bleeding has decreased and her pain is now well controlled.  She underwent a chest CT and an abdominal and pelvic MRI on 09/19/2023. This study demonstrated stable to slightly decreased thoracic lymphadenopathy, a left adrenal adenoma, a stable left hepatic lesion likely consistent with an adenoma or FNH, and poorly visualized inguinal and vaginal regions.   On 11/24/2023, she underwent an abdominal and pelvic MRI.  These studies, when compared with a prior CT scan from 05/30/2023 and a PET/CT dated 07/16/2023 demonstrated interval decrease in size and enhancement of the anterior vaginal mass and decrease in inguinal lymphadenopathy.  A 0.8 cm left hepatic lobe lesion is stable since 05/30/2023.  Since she was last seen, she was evaluated by Dr. Gary Goldberg for evaluation of her dysuria and intermittent vaginal pain, with recommendations for referral for supportive care/palliative care assessment.  Examination was significant for a polypoid black lesion protruding into the vaginal canal.  On 04/08/2024, she underwent a chest, abdominal and pelvic CT scan.  This study, when compared with a prior dated 01/09/2024, demonstrated mild increase in the size of the bladder mass and new peritoneal soft tissue thickening adjacent to the descending colon of unclear etiology.  She remains on fentanyl 25 mcg for her pain with minimal need for breakthrough medication.  She has discontinued her Lexapro.  PAST MEDICAL HISTORY: 1. Hypertension 2. Arthritis of the bilateral knees 3. status post knee surgery  SOCIAL HISTORY: She had 7 children, 5 of whom are alive.  She is currently residing with her daughter in [place].  ALLERGIES:  None

## 2024-04-11 NOTE — ASSESSMENT
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 She has mild peritoneal thickening on the most recent set of imaging studies which is of unclear etiology.  The bladder mass appears slightly enlarged.  She has completed definitive radiation to the vagina and inguinal nodes and is currently on nivolumab/relatlimab.  At this time, we will have her seen by our palliative care colleagues as well as by urology for further evaluation of the bladder mass.  We will continue nivolumab/relatlimab today, with her next dose in 1 months time prior to her departure to [facility]. I would recommend repeat imaging studies on an every 3 month schedule (next set due in July 2024).  She has been unable to pay for next generation sequencing of her tumor thus far.  We will refer her to [facility] to discuss various financial support options.  As she will be returning to [facility] in May, I have asked her family to identify an oncologist there with whom I can speak.  I will see her back in this clinic after her evaluation by urology and palliative care, and in 1 months time for her next dose of therapy.

## 2024-04-15 LAB
-  AMOXICILLIN/CLAVULANIC ACID: SIGNIFICANT CHANGE UP
-  AMPICILLIN/SULBACTAM: SIGNIFICANT CHANGE UP
-  AMPICILLIN: SIGNIFICANT CHANGE UP
-  AMPICILLIN: SIGNIFICANT CHANGE UP
-  AZTREONAM: SIGNIFICANT CHANGE UP
-  CEFAZOLIN: SIGNIFICANT CHANGE UP
-  CEFEPIME: SIGNIFICANT CHANGE UP
-  CEFOXITIN: SIGNIFICANT CHANGE UP
-  CEFTRIAXONE: SIGNIFICANT CHANGE UP
-  CEFUROXIME: SIGNIFICANT CHANGE UP
-  CIPROFLOXACIN: SIGNIFICANT CHANGE UP
-  CIPROFLOXACIN: SIGNIFICANT CHANGE UP
-  ERTAPENEM: SIGNIFICANT CHANGE UP
-  GENTAMICIN: SIGNIFICANT CHANGE UP
-  IMIPENEM: SIGNIFICANT CHANGE UP
-  LEVOFLOXACIN: SIGNIFICANT CHANGE UP
-  LEVOFLOXACIN: SIGNIFICANT CHANGE UP
-  MEROPENEM: SIGNIFICANT CHANGE UP
-  NITROFURANTOIN: SIGNIFICANT CHANGE UP
-  NITROFURANTOIN: SIGNIFICANT CHANGE UP
-  PIPERACILLIN/TAZOBACTAM: SIGNIFICANT CHANGE UP
-  TETRACYCLINE: SIGNIFICANT CHANGE UP
-  TOBRAMYCIN: SIGNIFICANT CHANGE UP
-  TRIMETHOPRIM/SULFAMETHOXAZOLE: SIGNIFICANT CHANGE UP
-  VANCOMYCIN: SIGNIFICANT CHANGE UP
CULTURE RESULTS: ABNORMAL
METHOD TYPE: SIGNIFICANT CHANGE UP
METHOD TYPE: SIGNIFICANT CHANGE UP
ORGANISM # SPEC MICROSCOPIC CNT: ABNORMAL
SPECIMEN SOURCE: SIGNIFICANT CHANGE UP

## 2024-04-23 RX ORDER — FENTANYL 25 UG/H
25 PATCH, EXTENDED RELEASE TRANSDERMAL
Qty: 10 | Refills: 0 | Status: ACTIVE | COMMUNITY
Start: 2023-10-12 | End: 1900-01-01

## 2024-04-23 RX ORDER — OXYCODONE 5 MG/1
5 TABLET ORAL
Qty: 180 | Refills: 0 | Status: ACTIVE | COMMUNITY
Start: 2023-06-23 | End: 1900-01-01

## 2024-04-24 ENCOUNTER — NON-APPOINTMENT (OUTPATIENT)
Age: 79
End: 2024-04-24

## 2024-04-25 DIAGNOSIS — R32 UNSPECIFIED URINARY INCONTINENCE: ICD-10-CM

## 2024-05-01 ENCOUNTER — OUTPATIENT (OUTPATIENT)
Dept: OUTPATIENT SERVICES | Facility: HOSPITAL | Age: 79
LOS: 1 days | Discharge: HOME | End: 2024-05-01

## 2024-05-01 ENCOUNTER — APPOINTMENT (OUTPATIENT)
Dept: UROLOGY | Facility: CLINIC | Age: 79
End: 2024-05-01
Payer: MEDICAID

## 2024-05-01 ENCOUNTER — OUTPATIENT (OUTPATIENT)
Dept: OUTPATIENT SERVICES | Facility: HOSPITAL | Age: 79
LOS: 1 days | End: 2024-05-01
Payer: MEDICAID

## 2024-05-01 VITALS — SYSTOLIC BLOOD PRESSURE: 150 MMHG | HEART RATE: 66 BPM | DIASTOLIC BLOOD PRESSURE: 87 MMHG

## 2024-05-01 DIAGNOSIS — M25.562 PAIN IN RIGHT KNEE: ICD-10-CM

## 2024-05-01 DIAGNOSIS — D49.4 NEOPLASM OF UNSPECIFIED BEHAVIOR OF BLADDER: ICD-10-CM

## 2024-05-01 DIAGNOSIS — G89.29 PAIN IN RIGHT KNEE: ICD-10-CM

## 2024-05-01 DIAGNOSIS — C43.31 MALIGNANT MELANOMA OF NOSE: ICD-10-CM

## 2024-05-01 DIAGNOSIS — M25.561 PAIN IN RIGHT KNEE: ICD-10-CM

## 2024-05-01 DIAGNOSIS — G89.3 NEOPLASM RELATED PAIN (ACUTE) (CHRONIC): ICD-10-CM

## 2024-05-01 PROCEDURE — 52000 CYSTOURETHROSCOPY: CPT

## 2024-05-01 PROCEDURE — 99215 OFFICE O/P EST HI 40 MIN: CPT | Mod: 25

## 2024-05-01 NOTE — LETTER BODY
[FreeTextEntry1] : Jose Ch MD [address] [place] [id number] Phone: [phone]  Dear Dr. Ch,  Reason for visit: Advanced metastatic vaginal melanoma.  Abnormal CT scan.  Bladder mass.  This is a 79 year-old woman with advanced metastatic vaginal melanoma.  Patient was seen previously by Dr. Hernandez.  She was subsequently diagnosed with metastatic vaginal melanoma.  There is a pelvic radiation and chemotherapy, she has developed progression of her vaginal melanoma.  Axial maging demonstrated involvement of the urinary bladder.  Patient has significant pelvic pain discomfort with urination.  She denies any gross hematuria.. Patient returns today for further evaluation.. Since the patient's last visit, patient reports chronic pelvic pain.. Patient denies any changes in health. Patient denies any []. The past medical history, family history and social history are unchanged. All other review of systems are negative. Patient denies any changes in medications. Medication list was reconciled.Patient is from  from [facility].  She is accompanied by her daughter today.  She serves as her .  On examination, the patient is older appearing woman in no acute distress. She is alert and oriented follows commands. She  has normal mood and affect. She is normocephalic. Neck is supple. Respirations are unlabored. Abdomen is soft and nontender. Liver is not palpable. Bladder is nonpalpable. No CVA tenderness. Neurologically she is grossly intact. No peripheral edema. Skin without gross abnormality.  On vaginal exam, patient has hyperpigmentation of her vulva and urethral meatus.  Patient also has a rigid mass involving the anterior vaginal wall.  I personally reviewed the CT scan with patient today. CT scan demonstrated diffuse involvement of the bladder wall with enhancement.  Assessment: Abnormal CT scan of bladder.  Advanced metastatic vaginal melanoma..     I counseled the patient.  Given her symptoms, recommend she proceed with cystoscopy to evaluate involving the urinary bladder.  Urine requested for cytology.. Risks and alternatives were discussed. I answered the patient questions. The patient will follow-up as directed and will contact me with any questions or concerns. Thank you for the opportunity to participate in the care of this patient. I'll keep you updated on her progress.  Plan: Cystoscopy.  Urine cytology.  Cystoscopy today demonstrated necrotic tumor in the bladder neck circumferentially.  The bladder neck tumor does obstruct the urinary outlet.  I counseled these findings with the patient and her daughter..  Patient may develop urinary obstruction secondary to progression of her vaginal melanoma.  The options of Carr catheter were discussed.  Patient has significant pelvic pain ready and declines Carr catheterization.  Patient may consider sooner continue placement to divert her urinary drainage.  This may serve as additional palliative therapy to improve her quality of life.  I spent 40-minutes time today on all issues related to this patient on today date of service including non face to face time.

## 2024-05-05 LAB
AMMONIUM BIURATE CRYSTALS: PRESENT
AMORPHOUS PHOSPHATE CRYSTALS: PRESENT
APPEARANCE: ABNORMAL
BACTERIA: ABNORMAL /HPF
BILIRUBIN URINE: ABNORMAL
BLOOD URINE: ABNORMAL
CAST: 0 /LPF
COLOR: ABNORMAL
EPITHELIAL CELLS: 8 /HPF
GLUCOSE QUALITATIVE U: 100 MG/DL
KETONES URINE: ABNORMAL MG/DL
LEUKOCYTE ESTERASE URINE: ABNORMAL
NITRITE URINE: NEGATIVE
PH URINE: >=9
PROTEIN URINE: >=1000 MG/DL
RED BLOOD CELLS URINE: 86 /HPF
REVIEW: NORMAL
SPECIFIC GRAVITY URINE: >1.03
TRIPLE PHOSPHATE CRYSTALS: PRESENT
URINE CYTOLOGY: NORMAL
UROBILINOGEN URINE: 1 MG/DL
WHITE BLOOD CELLS URINE: 97 /HPF

## 2024-05-08 DIAGNOSIS — M25.561 PAIN IN RIGHT KNEE: ICD-10-CM

## 2024-05-08 DIAGNOSIS — D49.4 NEOPLASM OF UNSPECIFIED BEHAVIOR OF BLADDER: ICD-10-CM

## 2024-05-08 DIAGNOSIS — R35.0 FREQUENCY OF MICTURITION: ICD-10-CM

## 2024-05-08 DIAGNOSIS — G89.3 NEOPLASM RELATED PAIN (ACUTE) (CHRONIC): ICD-10-CM

## 2024-05-09 ENCOUNTER — APPOINTMENT (OUTPATIENT)
Dept: HEMATOLOGY ONCOLOGY | Facility: CLINIC | Age: 79
End: 2024-05-09
Payer: MEDICAID

## 2024-05-09 VITALS
SYSTOLIC BLOOD PRESSURE: 147 MMHG | RESPIRATION RATE: 16 BRPM | BODY MASS INDEX: 30.3 KG/M2 | WEIGHT: 154.32 LBS | DIASTOLIC BLOOD PRESSURE: 82 MMHG | HEIGHT: 60 IN | OXYGEN SATURATION: 95 % | HEART RATE: 72 BPM | TEMPERATURE: 97.7 F

## 2024-05-09 PROCEDURE — 99214 OFFICE O/P EST MOD 30 MIN: CPT

## 2024-05-09 PROCEDURE — G2211 COMPLEX E/M VISIT ADD ON: CPT | Mod: NC,1L

## 2024-05-09 NOTE — REVIEW OF SYSTEMS
[Fatigue] : fatigue [Diarrhea: Grade 0] : Diarrhea: Grade 0 [Dysuria] : dysuria [Incontinence] : incontinence [Muscle Pain] : muscle pain [Negative] : Allergic/Immunologic [FreeTextEntry7] : Pain with defecation

## 2024-05-09 NOTE — ASSESSMENT
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 She has mild peritoneal thickening on the most recent set of imaging studies which is of unclear etiology.  The bladder mass appears slightly enlarged.  She has completed definitive radiation to the vagina and inguinal nodes and is currently on nivolumab/relatlimab.  At this time, we will proceed with her next dose of nivolumab/relatlimab on 05/11/2024.  We will assist her in obtaining her medical records and recent imaging studies on CD to take with her to [facility].  Once she identifies an oncologist there, I have asked the family to give me the contact information. The patient will need follow up with urology, gynecology and gastroenterology in [facility] as well.  I would recommend repeat imaging studies on an every 3 month schedule (next set due in July 2024).    She has been unable to pay for next generation sequencing of her tumor thus far.  We will proceed with local molecular testing.  Should there be progression on the next set of imaging studies and should there not be an actionable mutation, I anticipate recommending changing therapy to ipilimumab and nivolumab.  All questions and concerns were answered to their satisfaction.  They know to reach out to me any time in the future.

## 2024-05-09 NOTE — HISTORY OF PRESENT ILLNESS
[de-identified] : DIAGNOSIS:  Mucosal melanoma arising from the vagina involving the distal urethra and distal vagina anteriorly, with bilateral inguinal nicole involvement and possibly thoracic nicole involvement  MOLECULAR FINDINGS:  Not performed due to financial concerns  CURRENT THERAPY:  Nivolumab/relatlimab (she received cycle 6 on 12/04/2023, cycle 7 on 01/19/2024, cycle 8 on 02/19/2024, cycle 9 03/18/2024, cycle 10 04/11/2024)  PRIOR THERAPY: Radiotherapy 3000 cGy to bilateral inguinal nodes and vagina  INTERVAL HISTORY: Dina is an 79 year old female from [facility] who returns to this clinic with her family for management of her advanced vaginal melanoma.  She is currently living in [place] with her daughter.  Please see my initial consultation note for her complex prior history.  Briefly, she came [place] the [place] in [place] 20[place] from [facility] and then presented to the ED on 05/30/2023 for pain with urination as well as a vaginal mass.  An abdominal and pelvic CT scan, performed on 05/30/2023, demonstrated a lobulated 2.0 cm left inguinal node and a 1.5 cm right inguinal node, as well as an indeterminate 2.6 cm left adrenal nodule. Transabdominal and translabial sonogram demonstrated a 1.2 x 0.6 x 0.5 cm lesion protruding from the vaginal opening. She ultimately underwent a biopsy on 06/08/2023, with pathology consistent with an at least 4.1 mm ulcerated melanoma, with positive peripheral margins. There are 4 mitoses per mm2.  There is no lymphovascular invasion or neurotropism.  There are nonbrisk TILS.   She was seen by Dr. Gary Goldberg of gynecologic oncology on 06/21/2023, and then was referred to this clinic for further management.  Ultrasound guided biopsy of the regional nodes, performed on 06/30/2023, demonstrated disease in both the right and left biopsied inguinal nodes. Brain MRI, performed on 07/06/2023, was unremarkable. PET/CT scan, performed on 07/16/2023, demonstrated increased FDG uptake in the perineum and inguinal notes. There was uptake in a left adrenal nodule and hypermetabolic nodes were present in the chest and bilateral hilar regions.  She initiated therapy with nivolumab/relatlimab and received concurrent radiotherapy to the vaginal mass and the bilateral inguinal nodes.  Since beginning therapy, her vaginal bleeding has decreased and her pain is now well controlled.  She underwent a chest CT and an abdominal and pelvic MRI on 09/19/2023. This study demonstrated stable to slightly decreased thoracic lymphadenopathy, a left adrenal adenoma, a stable left hepatic lesion likely consistent with an adenoma or FNH, and poorly visualized inguinal and vaginal regions.   On 11/24/2023, she underwent an abdominal and pelvic MRI.  These studies, when compared with a prior CT scan from 05/30/2023 and a PET/CT dated 07/16/2023 demonstrated interval decrease in size and enhancement of the anterior vaginal mass and decrease in inguinal lymphadenopathy.  A 0.8 cm left hepatic lobe lesion is stable since 05/30/2023.  On 04/03/2024, she was evaluated by Dr. Gary Goldberg for evaluation of her dysuria and intermittent vaginal pain, with recommendations for referral for supportive care/palliative care assessment.  Examination was significant for a polypoid black lesion protruding into the vaginal canal.  On 04/08/2024, she underwent a chest, abdominal and pelvic CT scan.  This study, when compared with a prior dated 01/09/2024, demonstrated mild increase in the size of the bladder mass and new peritoneal soft tissue thickening adjacent to the descending colon of unclear etiology.    Since she was last seen here, she was evaluated by Dr. Levon Roa of urology who expressed concerns regarding potential urethral obstruction in the future and possible surpapubic catheter placement.  She remains on fentanyl 25 mcg for her pain with continued use of oxycodone for breakthrough pain, which is most significant with urination and defecation.  PAST MEDICAL HISTORY: 1. Hypertension 2. Arthritis of the bilateral knees 3. status post knee surgery  SOCIAL HISTORY: She had 7 children, 5 of whom are alive.  She is currently residing with her daughter in [place].  ALLERGIES:  None

## 2024-05-11 ENCOUNTER — RESULT REVIEW (OUTPATIENT)
Age: 79
End: 2024-05-11

## 2024-05-11 ENCOUNTER — APPOINTMENT (OUTPATIENT)
Dept: INFUSION THERAPY | Facility: HOSPITAL | Age: 79
End: 2024-05-11

## 2024-05-11 LAB
ALBUMIN SERPL ELPH-MCNC: 4 G/DL — SIGNIFICANT CHANGE UP (ref 3.3–5)
ALP SERPL-CCNC: 88 U/L — SIGNIFICANT CHANGE UP (ref 40–120)
ALT FLD-CCNC: 9 U/L — LOW (ref 10–45)
ANION GAP SERPL CALC-SCNC: 14 MMOL/L — SIGNIFICANT CHANGE UP (ref 5–17)
AST SERPL-CCNC: 17 U/L — SIGNIFICANT CHANGE UP (ref 10–40)
BASOPHILS # BLD AUTO: 0.02 K/UL — SIGNIFICANT CHANGE UP (ref 0–0.2)
BASOPHILS NFR BLD AUTO: 0.2 % — SIGNIFICANT CHANGE UP (ref 0–2)
BILIRUB SERPL-MCNC: 0.4 MG/DL — SIGNIFICANT CHANGE UP (ref 0.2–1.2)
BUN SERPL-MCNC: 10 MG/DL — SIGNIFICANT CHANGE UP (ref 7–23)
CALCIUM SERPL-MCNC: 9.3 MG/DL — SIGNIFICANT CHANGE UP (ref 8.4–10.5)
CHLORIDE SERPL-SCNC: 98 MMOL/L — SIGNIFICANT CHANGE UP (ref 96–108)
CO2 SERPL-SCNC: 26 MMOL/L — SIGNIFICANT CHANGE UP (ref 22–31)
CREAT SERPL-MCNC: 0.57 MG/DL — SIGNIFICANT CHANGE UP (ref 0.5–1.3)
EGFR: 92 ML/MIN/1.73M2 — SIGNIFICANT CHANGE UP
EOSINOPHIL # BLD AUTO: 0.28 K/UL — SIGNIFICANT CHANGE UP (ref 0–0.5)
EOSINOPHIL NFR BLD AUTO: 2.6 % — SIGNIFICANT CHANGE UP (ref 0–6)
GLUCOSE SERPL-MCNC: 147 MG/DL — HIGH (ref 70–99)
HCT VFR BLD CALC: 40.3 % — SIGNIFICANT CHANGE UP (ref 34.5–45)
HGB BLD-MCNC: 13 G/DL — SIGNIFICANT CHANGE UP (ref 11.5–15.5)
IMM GRANULOCYTES NFR BLD AUTO: 0.6 % — SIGNIFICANT CHANGE UP (ref 0–0.9)
LDH SERPL L TO P-CCNC: 278 U/L — HIGH (ref 50–242)
LYMPHOCYTES # BLD AUTO: 0.96 K/UL — LOW (ref 1–3.3)
LYMPHOCYTES # BLD AUTO: 8.8 % — LOW (ref 13–44)
MCHC RBC-ENTMCNC: 31.1 PG — SIGNIFICANT CHANGE UP (ref 27–34)
MCHC RBC-ENTMCNC: 32.3 G/DL — SIGNIFICANT CHANGE UP (ref 32–36)
MCV RBC AUTO: 96.4 FL — SIGNIFICANT CHANGE UP (ref 80–100)
MONOCYTES # BLD AUTO: 0.99 K/UL — HIGH (ref 0–0.9)
MONOCYTES NFR BLD AUTO: 9.1 % — SIGNIFICANT CHANGE UP (ref 2–14)
NEUTROPHILS # BLD AUTO: 8.57 K/UL — HIGH (ref 1.8–7.4)
NEUTROPHILS NFR BLD AUTO: 78.7 % — HIGH (ref 43–77)
NRBC # BLD: 0 /100 WBCS — SIGNIFICANT CHANGE UP (ref 0–0)
PLATELET # BLD AUTO: 255 K/UL — SIGNIFICANT CHANGE UP (ref 150–400)
POTASSIUM SERPL-MCNC: 3.6 MMOL/L — SIGNIFICANT CHANGE UP (ref 3.5–5.3)
POTASSIUM SERPL-SCNC: 3.6 MMOL/L — SIGNIFICANT CHANGE UP (ref 3.5–5.3)
PROT SERPL-MCNC: 6.9 G/DL — SIGNIFICANT CHANGE UP (ref 6–8.3)
RBC # BLD: 4.18 M/UL — SIGNIFICANT CHANGE UP (ref 3.8–5.2)
RBC # FLD: 13.9 % — SIGNIFICANT CHANGE UP (ref 10.3–14.5)
SODIUM SERPL-SCNC: 138 MMOL/L — SIGNIFICANT CHANGE UP (ref 135–145)
T4 FREE SERPL-MCNC: 1.1 NG/DL — SIGNIFICANT CHANGE UP (ref 0.9–1.8)
TSH SERPL-MCNC: 6.85 UIU/ML — HIGH (ref 0.27–4.2)
WBC # BLD: 10.88 K/UL — HIGH (ref 3.8–10.5)
WBC # FLD AUTO: 10.88 K/UL — HIGH (ref 3.8–10.5)

## 2024-05-14 ENCOUNTER — LABORATORY RESULT (OUTPATIENT)
Age: 79
End: 2024-05-14

## 2024-05-14 DIAGNOSIS — Z51.11 ENCOUNTER FOR ANTINEOPLASTIC CHEMOTHERAPY: ICD-10-CM

## 2024-05-15 LAB — Lab: NORMAL
